# Patient Record
Sex: MALE | Employment: UNEMPLOYED | URBAN - METROPOLITAN AREA
[De-identification: names, ages, dates, MRNs, and addresses within clinical notes are randomized per-mention and may not be internally consistent; named-entity substitution may affect disease eponyms.]

---

## 2019-09-17 ENCOUNTER — ANCILLARY PROCEDURE (OUTPATIENT)
Dept: GENERAL RADIOLOGY | Facility: CLINIC | Age: 24
End: 2019-09-17
Attending: FAMILY MEDICINE

## 2019-09-17 ENCOUNTER — PATIENT OUTREACH (OUTPATIENT)
Dept: CARE COORDINATION | Facility: CLINIC | Age: 24
End: 2019-09-17

## 2019-09-17 ENCOUNTER — OFFICE VISIT (OUTPATIENT)
Dept: FAMILY MEDICINE | Facility: CLINIC | Age: 24
End: 2019-09-17

## 2019-09-17 ENCOUNTER — TELEPHONE (OUTPATIENT)
Dept: CARE COORDINATION | Facility: CLINIC | Age: 24
End: 2019-09-17

## 2019-09-17 ENCOUNTER — TELEPHONE (OUTPATIENT)
Dept: ADDICTION MEDICINE | Facility: CLINIC | Age: 24
End: 2019-09-17

## 2019-09-17 VITALS
DIASTOLIC BLOOD PRESSURE: 91 MMHG | BODY MASS INDEX: 29.39 KG/M2 | SYSTOLIC BLOOD PRESSURE: 167 MMHG | WEIGHT: 217 LBS | HEIGHT: 72 IN | OXYGEN SATURATION: 94 % | TEMPERATURE: 99 F | HEART RATE: 99 BPM

## 2019-09-17 DIAGNOSIS — F10.20 ALCOHOL DEPENDENCE, CONTINUOUS (H): ICD-10-CM

## 2019-09-17 DIAGNOSIS — M25.512 ACUTE PAIN OF LEFT SHOULDER: Primary | ICD-10-CM

## 2019-09-17 DIAGNOSIS — M25.572 PAIN IN JOINT, ANKLE AND FOOT, LEFT: ICD-10-CM

## 2019-09-17 DIAGNOSIS — M25.512 ACUTE PAIN OF LEFT SHOULDER: ICD-10-CM

## 2019-09-17 PROBLEM — F10.930 ALCOHOL WITHDRAWAL SYNDROME WITHOUT COMPLICATION (H): Status: ACTIVE | Noted: 2018-10-16

## 2019-09-17 LAB
ALBUMIN SERPL-MCNC: 3.7 G/DL (ref 3.4–5)
ALP SERPL-CCNC: 172 U/L (ref 40–150)
ALT SERPL W P-5'-P-CCNC: 122 U/L (ref 0–70)
ANION GAP SERPL CALCULATED.3IONS-SCNC: 8 MMOL/L (ref 3–14)
AST SERPL W P-5'-P-CCNC: 212 U/L (ref 0–45)
BASOPHILS # BLD AUTO: 0 10E9/L (ref 0–0.2)
BASOPHILS NFR BLD AUTO: 0.2 %
BILIRUB SERPL-MCNC: 3.4 MG/DL (ref 0.2–1.3)
BUN SERPL-MCNC: 10 MG/DL (ref 7–30)
CALCIUM SERPL-MCNC: 9.8 MG/DL (ref 8.5–10.1)
CHLORIDE SERPL-SCNC: 102 MMOL/L (ref 94–109)
CO2 SERPL-SCNC: 27 MMOL/L (ref 20–32)
CREAT SERPL-MCNC: 0.52 MG/DL (ref 0.66–1.25)
CRP SERPL-MCNC: 133 MG/L (ref 0–8)
DIFFERENTIAL METHOD BLD: ABNORMAL
EOSINOPHIL # BLD AUTO: 0 10E9/L (ref 0–0.7)
EOSINOPHIL NFR BLD AUTO: 0.1 %
ERYTHROCYTE [DISTWIDTH] IN BLOOD BY AUTOMATED COUNT: 13.6 % (ref 10–15)
GFR SERPL CREATININE-BSD FRML MDRD: >90 ML/MIN/{1.73_M2}
GLUCOSE SERPL-MCNC: 119 MG/DL (ref 70–99)
HCT VFR BLD AUTO: 42.7 % (ref 40–53)
HGB BLD-MCNC: 14.1 G/DL (ref 13.3–17.7)
LYMPHOCYTES # BLD AUTO: 1.4 10E9/L (ref 0.8–5.3)
LYMPHOCYTES NFR BLD AUTO: 11.9 %
MCH RBC QN AUTO: 31.9 PG (ref 26.5–33)
MCHC RBC AUTO-ENTMCNC: 33 G/DL (ref 31.5–36.5)
MCV RBC AUTO: 97 FL (ref 78–100)
MONOCYTES # BLD AUTO: 1.8 10E9/L (ref 0–1.3)
MONOCYTES NFR BLD AUTO: 15.5 %
NEUTROPHILS # BLD AUTO: 8.4 10E9/L (ref 1.6–8.3)
NEUTROPHILS NFR BLD AUTO: 72.3 %
PLATELET # BLD AUTO: 206 10E9/L (ref 150–450)
POTASSIUM SERPL-SCNC: 3.5 MMOL/L (ref 3.4–5.3)
PROT SERPL-MCNC: 9 G/DL (ref 6.8–8.8)
RBC # BLD AUTO: 4.42 10E12/L (ref 4.4–5.9)
SODIUM SERPL-SCNC: 137 MMOL/L (ref 133–144)
WBC # BLD AUTO: 11.6 10E9/L (ref 4–11)

## 2019-09-17 PROCEDURE — 80053 COMPREHEN METABOLIC PANEL: CPT | Performed by: FAMILY MEDICINE

## 2019-09-17 PROCEDURE — 73030 X-RAY EXAM OF SHOULDER: CPT | Mod: LT

## 2019-09-17 PROCEDURE — 85025 COMPLETE CBC W/AUTO DIFF WBC: CPT | Performed by: FAMILY MEDICINE

## 2019-09-17 PROCEDURE — 99214 OFFICE O/P EST MOD 30 MIN: CPT | Performed by: FAMILY MEDICINE

## 2019-09-17 PROCEDURE — 73610 X-RAY EXAM OF ANKLE: CPT | Mod: RT

## 2019-09-17 PROCEDURE — 86140 C-REACTIVE PROTEIN: CPT | Performed by: FAMILY MEDICINE

## 2019-09-17 PROCEDURE — 36415 COLL VENOUS BLD VENIPUNCTURE: CPT | Performed by: FAMILY MEDICINE

## 2019-09-17 RX ORDER — NAPROXEN 500 MG/1
500 TABLET ORAL 2 TIMES DAILY WITH MEALS
Qty: 30 TABLET | Refills: 1 | Status: SHIPPED | OUTPATIENT
Start: 2019-09-17 | End: 2021-05-11

## 2019-09-17 ASSESSMENT — MIFFLIN-ST. JEOR: SCORE: 2007.31

## 2019-09-17 ASSESSMENT — PAIN SCALES - GENERAL: PAINLEVEL: EXTREME PAIN (8)

## 2019-09-17 NOTE — PROGRESS NOTES
Subjective     Tulio Rosales is a 24 year old male who presents to clinic today for the following health issues:    HPI   Musculoskeletal problem/pain      Duration: 3 weeks ago- pain went away and came back    Description  Location: left shoulder and right ankle    Intensity:  8/10    Accompanying signs and symptoms: swelling in the shoulder, some swelling in the right ankle    History  Previous similar problem: no   Previous evaluation:  none    Precipitating or alleviating factors:  Trauma or overuse: YES- Fell in the tub a month ago or a little less and fell on the back not on the shoulder   Aggravating factors include: moving, sitting, lifting- anything hurts    Therapies tried and outcome: Ibuprofen helps to a certain extent.    Preeti Celeste MA    Patient is here with significant left shoulder pain and right ankle pain.  He admits to falling at home about 3 weeks ago in the bathtub but did not have any pain immediately after that for a few days and then that is when the shoulder pain started.  It got better after about a week and a now has worsened again and he considers it unbearable.  All movements hurt.  He is unable to sleep because of the pain.  He withdraws an area of pain along the clavicle to the acromion back over the scapula and down into the proximal humerus but nothing radiating down below the elbow into the hands or fingers.  He has not had any fevers, or chills.    He also notes some right ankle pain.  He does not recall any particular injury.  This has become increasingly swollen and painful over the last week.  It is making it more difficult to walk.  Over-the-counter medications have not been helpful.  He denies any urethral discharge or dysuria.  He does not have any rashes.  No fevers, or chills noted.    When questioned today he does confirm a significant history of alcohol use.  He drinks probably a liter of vodka per day.  When I mentioned the possibility of an addiction medicine  referral he expressed interest in doing that.  He is here with his fiancée today.  He has had some emergency room visits for alcohol intoxication and has been admitted to detox in the past but has not been able to sustain remission from alcohol use for any period of time or find an AA program helpful for him.        Patient Active Problem List   Diagnosis     Alcohol withdrawal syndrome without complication (H)     Past Surgical History:   Procedure Laterality Date     NO HISTORY OF SURGERY         Social History     Tobacco Use     Smoking status: Never Smoker     Smokeless tobacco: Never Used   Substance Use Topics     Alcohol use: Yes     Comment: one liter per day vodka     History reviewed. No pertinent family history.      Current Outpatient Medications   Medication Sig Dispense Refill     IBUPROFEN PO        naproxen (NAPROSYN) 500 MG tablet Take 1 tablet (500 mg) by mouth 2 times daily (with meals) 30 tablet 1     No Known Allergies    Reviewed and updated as needed this visit by Provider  Tobacco  Allergies  Meds  Problems  Med Hx  Surg Hx  Fam Hx  Soc Hx          Review of Systems   ROS COMP: Constitutional, HEENT, cardiovascular, pulmonary, gi and gu systems are negative, except as otherwise noted.      Objective    BP (!) 167/91 (BP Location: Right arm, Patient Position: Chair, Cuff Size: Adult Regular)   Pulse 99   Temp 99  F (37.2  C) (Oral)   Ht 1.829 m (6')   Wt 98.4 kg (217 lb)   SpO2 94%   BMI 29.43 kg/m    Body mass index is 29.43 kg/m .  Physical Exam   GENERAL: healthy, alert and no distress  EYES: Eyes grossly normal to inspection, PERRL and conjunctivae and sclerae normal  NECK: no adenopathy, no asymmetry, masses, or scars and thyroid normal to palpation  MS: no gross musculoskeletal defects noted, no edema  MS: Today he really only tolerates inspection of the shoulder with palpation of some of the bony landmarks but even with light palpation his pain response seems exaggerated.   There is no obvious deformity.  I do not appreciate any joint effusions around the ankle or shoulder or warmth suggestive of infection.  The left elbow has normal range of motion for flexion, extension, supination, pronation.  The wrist has normal flexion and extension.  Inspection of the right ankle does show some swelling and bruising over the medial malleolus.  The Achilles tendon is intact to palpation.  There is no tenderness laterally over the fibula or lateral malleolus.  No obvious joint effusion is noted.  SKIN: no suspicious lesions or rashes  NEURO: Normal strength and tone, mentation intact and speech normal  PSYCH: mentation appears normal, anxious, judgement and insight intact, appearance well groomed and at times during the examination he is tremulous and somewhat resistant.    Diagnostic Test Results:  Labs reviewed in Epic  Xray -x-ray of the left shoulder was done and I do not appreciate any acute fracture or dislocation.  X-ray of the left ankle was done and I do not appreciate any acute fracture dislocation.  Both x-rays were reviewed with the patient.        Assessment & Plan     1. Acute pain of left shoulder  His pain response seems out of proportion to what I would suspect based on the limited exam findings and normal x-rays today.  I do not see a lot of evidence to suggest septic arthritis but did check some blood work today.  Naproxen for pain as I am concerned about the mixture of alcohol and anything stronger.  Laboratory tests as noted.  I would like him to see orthopedics in the near future for an assessment as well.  - XR Shoulder Left G/E 3 Views; Future  - CBC with platelets and differential  - CRP, inflammation  - ORTHOPEDICS ADULT REFERRAL  - naproxen (NAPROSYN) 500 MG tablet; Take 1 tablet (500 mg) by mouth 2 times daily (with meals)  Dispense: 30 tablet; Refill: 1    2. Pain in joint, ankle and foot, left  As above.  - XR Ankle Right G/E 3 Views; Future  - CBC with platelets and  differential  - CRP, inflammation  - ORTHOPEDICS ADULT REFERRAL  - naproxen (NAPROSYN) 500 MG tablet; Take 1 tablet (500 mg) by mouth 2 times daily (with meals)  Dispense: 30 tablet; Refill: 1    3. Alcohol dependence, continuous (H)  He uses alcohol heavily and is been into the emergency room a number of times of intoxication sent to detox.  When I mentioned the possibility of an addiction medicine referral today he expressed interest in that.  That referral was placed as well as a care coordination referral since he is currently without insurance.  He was interested in learning a little bit about his liver functions so CMP was checked today.  - Comprehensive metabolic panel (BMP + Alb, Alk Phos, ALT, AST, Total. Bili, TP)  - ADDICTION MEDICINE REFERRAL  - CARE COORDINATION REFERRAL     BMI:   Estimated body mass index is 29.43 kg/m  as calculated from the following:    Height as of this encounter: 1.829 m (6').    Weight as of this encounter: 98.4 kg (217 lb).   Weight management plan: Discussed healthy diet and exercise guidelines            Return in about 2 weeks (around 10/1/2019) for Routine Visit.    Marquise Rico MD  [facility]

## 2019-09-17 NOTE — LETTER
September 17, 2019      Tulio Rosales  [address]   [place] [address]        To Whom It May Concern:    Tulio Rosales was seen in our clinic. He may return to work without restrictions on 9/20/2019.  Please excuse him from work missed up until this time.      Sincerely,        Marquise Rico MD

## 2019-09-17 NOTE — TELEPHONE ENCOUNTER
Please review referral. Please route back to reception pool #[id number].    Note: Pt has no insurance listed in Epic.    Thank you,    Mabel Garcia  [facility]

## 2019-09-17 NOTE — TELEPHONE ENCOUNTER
Patient is request an excuse letter for work. He stated he has been out of work for a week and feels that it will be additional 2 days.      Please reach out to patient with questions and when letter is completed.

## 2019-09-17 NOTE — PROGRESS NOTES
The clinic Community Health Worker with  the patient today at the request of the PCP to discuss possible Clinic Care Coordination enrollment.  The service was described to the patient and immediate needs were discussed.  The patient declined enrollement at this time.  The PCP is encouraged to refer in the future if the patient's needs change.    Notes:   Patient stated he doesn't need support at this time to apply for insurance. He is applying through [employer] and was going to pick a plan. However the system shut down. I advised to clear cookies and history and it should hopefully work.     During my call, I provided him 2 Brokers to discuss insurance options with.     [employer]   [phone]    Face To face  [phone]    Patient asked for an excuse letter for work. Telephone request sent to PCP.     Reason for Referral: Financial Support: Insurance and Medication Affordability and Mental Wellness (Health) (Mental Illness/Chemical Depedency): Chemical Health Assessments  Additional pertinent details: Also referred to addiction

## 2019-09-17 NOTE — TELEPHONE ENCOUNTER
Writer spoke with pt. Pt stated that he doesn't have current insurance but is starting the process and will reach out to the clinic as soon as he has insurance. Writer is closing encounter as pt stated he will call once insurance issue is handled.     Mabel Garcia  [facility]

## 2019-09-18 ENCOUNTER — OFFICE VISIT (OUTPATIENT)
Dept: ORTHOPEDICS | Facility: CLINIC | Age: 24
End: 2019-09-18

## 2019-09-18 ENCOUNTER — TELEPHONE (OUTPATIENT)
Dept: FAMILY MEDICINE | Facility: CLINIC | Age: 24
End: 2019-09-18

## 2019-09-18 VITALS
DIASTOLIC BLOOD PRESSURE: 98 MMHG | OXYGEN SATURATION: 98 % | BODY MASS INDEX: 29.39 KG/M2 | SYSTOLIC BLOOD PRESSURE: 163 MMHG | HEIGHT: 72 IN | HEART RATE: 62 BPM | WEIGHT: 217 LBS

## 2019-09-18 DIAGNOSIS — M25.512 PAIN OF LEFT STERNOCLAVICULAR JOINT: Primary | ICD-10-CM

## 2019-09-18 DIAGNOSIS — M13.0 POLYARTHRITIS: ICD-10-CM

## 2019-09-18 DIAGNOSIS — M19.012 ARTHRITIS OF LEFT ACROMIOCLAVICULAR JOINT: ICD-10-CM

## 2019-09-18 LAB — ERYTHROCYTE [SEDIMENTATION RATE] IN BLOOD BY WESTERGREN METHOD: 44 MM/H (ref 0–15)

## 2019-09-18 PROCEDURE — 99244 OFF/OP CNSLTJ NEW/EST MOD 40: CPT | Performed by: ORTHOPAEDIC SURGERY

## 2019-09-18 PROCEDURE — 85652 RBC SED RATE AUTOMATED: CPT | Performed by: ORTHOPAEDIC SURGERY

## 2019-09-18 PROCEDURE — 84550 ASSAY OF BLOOD/URIC ACID: CPT | Performed by: ORTHOPAEDIC SURGERY

## 2019-09-18 PROCEDURE — 36415 COLL VENOUS BLD VENIPUNCTURE: CPT | Performed by: ORTHOPAEDIC SURGERY

## 2019-09-18 RX ORDER — CEPHALEXIN 500 MG/1
500 CAPSULE ORAL 3 TIMES DAILY
Qty: 60 CAPSULE | Refills: 1 | Status: SHIPPED | OUTPATIENT
Start: 2019-09-18 | End: 2021-05-11

## 2019-09-18 ASSESSMENT — MIFFLIN-ST. JEOR: SCORE: 2007.31

## 2019-09-18 NOTE — LETTER
9/18/2019         RE: Tulio Rosales  [address]  [place] [address]        Dear Colleague,    Thank you for referring your patient, Tulio Rosales, to the [facility]. Please see a copy of my visit note below.    SUBJECTIVE:  Tulio Rosales is a 24 year old male who is seen in consultation at the request of Marquise Rico MD  for left shoulder pain that started  that started/occurred  Approximately 4 weeks ago. Fall at home in the bathtub.   Pain started 3 weeks ago, then resolved, then the pain started up a second time 8 days ago.  Severe pain.  Pain to move shoulder.  Naproxen helps the pain a lot.   + fever/chills.   + night sweats.  He has not had a recent illness.  Pain location: trapezial, chest wall and worst StC joint  Associated symptoms: neck pain, which seem to the patient to be related to the shoulder symptoms    Prior history of related problems: no prior problems with this area in the past  Significant Orthopedic past medical history: none    He is a heavy drinker and has admitted to having a problem with alcohol.  No other street drugs reported.    Past Medical History:   Diagnosis Date     Admitted to substance misuse detoxification center        Past Surgical History:   Procedure Laterality Date     NO HISTORY OF SURGERY         REVIEW OF SYSTEMS:  CONSTITUTIONAL:  NEGATIVE for fever, chills, change in weight  INTEGUMENTARY/SKIN:  NEGATIVE for worrisome rashes, moles or lesions  EYES:  NEGATIVE for vision changes or irritation  ENT/MOUTH:  NEGATIVE for ear, mouth and throat problems  RESP:  NEGATIVE for significant cough or SOB  BREAST:  NEGATIVE for masses, tenderness or discharge  CV:  POSITIVE for palpitations and no history of murmurs that he knows of.  GI:  NEGATIVE for nausea, abdominal pain, heartburn, or change in bowel habits  :  Negative   MUSCULOSKELETAL:  See HPI above.  Ankle swollen and pain started 1 week ago. Pain on medial side.   No injury.  Pain with weight bearing.   NEURO:  NEGATIVE for weakness, dizziness or paresthesias  ENDOCRINE:  NEGATIVE for temperature intolerance, skin/hair changes  HEME/ALLERGY/IMMUNE:  NEGATIVE for bleeding problems  PSYCHIATRIC:  NEGATIVE for changes in mood or affect    OBJECTIVE:  BP (!) 163/98 (BP Location: Left arm, Patient Position: Sitting, Cuff Size: Adult Regular)   Pulse 62   Ht 1.829 m (6')   Wt 98.4 kg (217 lb)   SpO2 98%   BMI 29.43 kg/m         GENERAL: healthy, alert.  He is slightly agitated.  GAIT: normal   SKIN: no suspicious lesions or rashes  NEURO: Normal strength and tone, mentation intact and speech normal  VASCULAR:  normal pulses and cappillary refill   PSYCH:  mentation appears normal and affect normal/bright  RESP: No increased work of breathing  LYMPH:  No lymphedema    MUSCULOSKELETAL:  Right ankle:  Some erythema over medial malleolus, and a contusion.  Some mild localized swelling, but no fluid pocket.  Some fullness and tenderness over posterior tibial tendon.  Ankle range of motion is somewhat limited, but able    NECK:  Cervical range of motion: limited by 25% in lateral tilt, and reproduces pain in the StC joint.  Pain in the neck with lateral tilt to left.    Sensory deficits:  none  Motor deficits:  none    SHOULDER:  Shoulder Inspection: He does have some swelling of the left sternoclavicular joint.  There may be some slight swelling over the left AC joint as well.  I do not see any redness over these joints.    Tender: He is extremely tender over the sternoclavicular joint on the left side.  Even light touch is painful here.  Light touch over the left AC joint is painful as well.  He has a fair amount of tenderness throughout the left trapezius muscle also.    Range of Motion:   Active: He is not willing to move his shoulder activelymuch   Passive: I am able to move his shoulder fairly well, but it causes pain which is referred to the AC joint sternoclavicular joint  and trapezius region.  Impingement:   Strength: Not tested     X-RAY INTERPRETATION  Left shoulder, 3 views from 9/17/2019   Essentially normal    Labs:   CRP Inflammation 133.0  High   0.0 - 8.0 mg/L Final 09/17/2019 11:11 AM     WBC 11.6  High   4.0 - 11.0 10e9/L Final 09/17/2019 11:11 AM CP     Results for RAYO BROUSSARD (MRN [id number]) as of 9/18/2019 17:53   Ref. Range 9/17/2019 11:11 9/18/2019 15:02   Sed Rate Latest Ref Range: 0 - 15 mm/h  44 (H)       ASSESSMENT    ICD-10-CM    1. Pain of left sternoclavicular joint M25.512 Uric acid     Erythrocyte sedimentation rate auto     cephALEXin (KEFLEX) 500 MG capsule     ORTHO  REFERRAL     Gram stain     Anaerobic bacterial culture     Cell count with differential fluid     CANCELED: Fluid Culture Aerobic Bacterial   2. Arthritis of left acromioclavicular joint M19.012    3. Polyarthritis M13.0      Multifocal polyarthritis possibly septic.  This involves the left sternoclavicular and AC joints as well as the right ankle, with some cellulitis and possibly some posterior tibial tendinitis.    PLAN:   I ordered a uric acid level, checking for gout.  Sed rate was ordered and is 44  I suggested that the patient be admitted for further evaluation, testing and IV antibiotics.  He is uninsured and refuses hospital admission at this time.  I did talk to him about the absolute indications for going to the hospital in regards to worsening of his present condition.  I ordered additional rheumatologic serum test to be done  Keflex was ordered.  I have also ordered an  Image guided aspiration to be done of the left sternoclavicular joint, since I think that this may be the most accessible, as well as the most affected joint.  The fluid would be sent for Gram stain and cultures as well as crystal analysis.   We will monitor his results closely and he understands that he should communicate with us closely about his progress.  We will communicate with him about test  results as they come in.    .RADHAMES Campos MD  Dept. Orthopedic Surgery  [facility]    Again, thank you for allowing me to participate in the care of your patient.        Sincerely,        Daniele Campos MD

## 2019-09-18 NOTE — PROGRESS NOTES
SUBJECTIVE:  Tulio Rosales is a 24 year old male who is seen in consultation at the request of Marquise Rico MD  for left shoulder pain that started  that started/occurred  Approximately 4 weeks ago. Fall at home in the bathtub.   Pain started 3 weeks ago, then resolved, then the pain started up a second time 8 days ago.  Severe pain.  Pain to move shoulder.  Naproxen helps the pain a lot.   + fever/chills.   + night sweats.  He has not had a recent illness.  Pain location: trapezial, chest wall and worst StC joint  Associated symptoms: neck pain, which seem to the patient to be related to the shoulder symptoms    Prior history of related problems: no prior problems with this area in the past  Significant Orthopedic past medical history: none    He is a heavy drinker and has admitted to having a problem with alcohol.  No other street drugs reported.    Past Medical History:   Diagnosis Date     Admitted to substance misuse detoxification center        Past Surgical History:   Procedure Laterality Date     NO HISTORY OF SURGERY         REVIEW OF SYSTEMS:  CONSTITUTIONAL:  NEGATIVE for fever, chills, change in weight  INTEGUMENTARY/SKIN:  NEGATIVE for worrisome rashes, moles or lesions  EYES:  NEGATIVE for vision changes or irritation  ENT/MOUTH:  NEGATIVE for ear, mouth and throat problems  RESP:  NEGATIVE for significant cough or SOB  BREAST:  NEGATIVE for masses, tenderness or discharge  CV:  POSITIVE for palpitations and no history of murmurs that he knows of.  GI:  NEGATIVE for nausea, abdominal pain, heartburn, or change in bowel habits  :  Negative   MUSCULOSKELETAL:  See HPI above.  Ankle swollen and pain started 1 week ago. Pain on medial side.  No injury.  Pain with weight bearing.   NEURO:  NEGATIVE for weakness, dizziness or paresthesias  ENDOCRINE:  NEGATIVE for temperature intolerance, skin/hair changes  HEME/ALLERGY/IMMUNE:  NEGATIVE for bleeding problems  PSYCHIATRIC:  NEGATIVE for  changes in mood or affect    OBJECTIVE:  BP (!) 163/98 (BP Location: Left arm, Patient Position: Sitting, Cuff Size: Adult Regular)   Pulse 62   Ht 1.829 m (6')   Wt 98.4 kg (217 lb)   SpO2 98%   BMI 29.43 kg/m        GENERAL: healthy, alert.  He is slightly agitated.  GAIT: normal   SKIN: no suspicious lesions or rashes  NEURO: Normal strength and tone, mentation intact and speech normal  VASCULAR:  normal pulses and cappillary refill   PSYCH:  mentation appears normal and affect normal/bright  RESP: No increased work of breathing  LYMPH:  No lymphedema    MUSCULOSKELETAL:  Right ankle:  Some erythema over medial malleolus, and a contusion.  Some mild localized swelling, but no fluid pocket.  Some fullness and tenderness over posterior tibial tendon.  Ankle range of motion is somewhat limited, but able    NECK:  Cervical range of motion: limited by 25% in lateral tilt, and reproduces pain in the StC joint.  Pain in the neck with lateral tilt to left.    Sensory deficits:  none  Motor deficits:  none    SHOULDER:  Shoulder Inspection: He does have some swelling of the left sternoclavicular joint.  There may be some slight swelling over the left AC joint as well.  I do not see any redness over these joints.    Tender: He is extremely tender over the sternoclavicular joint on the left side.  Even light touch is painful here.  Light touch over the left AC joint is painful as well.  He has a fair amount of tenderness throughout the left trapezius muscle also.    Range of Motion:   Active: He is not willing to move his shoulder activelymuch   Passive: I am able to move his shoulder fairly well, but it causes pain which is referred to the AC joint sternoclavicular joint and trapezius region.  Impingement:   Strength: Not tested     X-RAY INTERPRETATION  Left shoulder, 3 views from 9/17/2019   Essentially normal    Labs:   CRP Inflammation 133.0  High   0.0 - 8.0 mg/L Final 09/17/2019 11:11 AM     WBC 11.6  High   4.0  - 11.0 10e9/L Final 09/17/2019 11:11 AM CP     Results for RAYO BROUSSARD (MRN [id number]) as of 9/18/2019 17:53   Ref. Range 9/17/2019 11:11 9/18/2019 15:02   Sed Rate Latest Ref Range: 0 - 15 mm/h  44 (H)       ASSESSMENT    ICD-10-CM    1. Pain of left sternoclavicular joint M25.512 Uric acid     Erythrocyte sedimentation rate auto     cephALEXin (KEFLEX) 500 MG capsule     ORTHO  REFERRAL     Gram stain     Anaerobic bacterial culture     Cell count with differential fluid     CANCELED: Fluid Culture Aerobic Bacterial   2. Arthritis of left acromioclavicular joint M19.012    3. Polyarthritis M13.0      Multifocal polyarthritis possibly septic.  This involves the left sternoclavicular and AC joints as well as the right ankle, with some cellulitis and possibly some posterior tibial tendinitis.    PLAN:   I ordered a uric acid level, checking for gout.  Sed rate was ordered and is 44  I suggested that the patient be admitted for further evaluation, testing and IV antibiotics.  He is uninsured and refuses hospital admission at this time.  I did talk to him about the absolute indications for going to the hospital in regards to worsening of his present condition.  I ordered additional rheumatologic serum test to be done  Keflex was ordered.  I have also ordered an  Image guided aspiration to be done of the left sternoclavicular joint, since I think that this may be the most accessible, as well as the most affected joint.  The fluid would be sent for Gram stain and cultures as well as crystal analysis.   We will monitor his results closely and he understands that he should communicate with us closely about his progress.  We will communicate with him about test results as they come in.    .RADHAMES Campos MD  Dept. Orthopedic Surgery  [facility]

## 2019-09-18 NOTE — TELEPHONE ENCOUNTER
Notified patient of the message below per provider.  Transferred to  to help schedule ortho appointment.    Malinda Pereira RN,BSN  [facility]

## 2019-09-18 NOTE — TELEPHONE ENCOUNTER
----- Message from Marquise Rico MD sent at 9/18/2019  8:52 AM CDT -----  Please call patient back and let him know that some of the laboratory tests have me a bit concerned about a possible infection in the joint.  This could explain the severity of the joint pain but these are not common problems.  I would really recommend he see the orthopedic doctor for an opinion.    Marquise Rico MD

## 2019-09-19 LAB — URATE SERPL-MCNC: 3.7 MG/DL (ref 3.5–7.2)

## 2019-09-24 ENCOUNTER — OFFICE VISIT (OUTPATIENT)
Dept: ORTHOPEDICS | Facility: CLINIC | Age: 24
End: 2019-09-24

## 2019-09-24 VITALS
OXYGEN SATURATION: 98 % | SYSTOLIC BLOOD PRESSURE: 144 MMHG | HEART RATE: 87 BPM | TEMPERATURE: 98.5 F | DIASTOLIC BLOOD PRESSURE: 88 MMHG

## 2019-09-24 DIAGNOSIS — M13.0 POLYARTHRITIS: Primary | ICD-10-CM

## 2019-09-24 PROCEDURE — 99213 OFFICE O/P EST LOW 20 MIN: CPT | Performed by: ORTHOPAEDIC SURGERY

## 2019-09-24 ASSESSMENT — PAIN SCALES - GENERAL: PAINLEVEL: MILD PAIN (3)

## 2019-09-24 NOTE — PROGRESS NOTES
SUBJECTIVE:  Tulio Rosales is here in follow up of sternoclavicular joint pain and arthritis, as well as polyarthritis, possibly septic. Today he returns doing a lot better.  He has been taking oral antibiotics for 1 week now. Denies furthers fevers, chills or sweats. Pain in the sternoclavicular and AC joints have greatly improved however he continues to have some mild pain there. He has not had his sternoclavicular joint aspiration yet.    Component      Latest Ref Rng & Units 9/17/2019 9/18/2019   WBC      4.0 - 11.0 10e9/L 11.6 (H)    RBC Count      4.4 - 5.9 10e12/L 4.42    Hemoglobin      13.3 - 17.7 g/dL 14.1    Hematocrit      40.0 - 53.0 % 42.7    MCV      78 - 100 fl 97    MCH      26.5 - 33.0 pg 31.9    MCHC      31.5 - 36.5 g/dL 33.0    RDW      10.0 - 15.0 % 13.6    Platelet Count      150 - 450 10e9/L 206    % Neutrophils      % 72.3    % Lymphocytes      % 11.9    % Monocytes      % 15.5    % Eosinophils      % 0.1    % Basophils      % 0.2    Absolute Neutrophil      1.6 - 8.3 10e9/L 8.4 (H)    Absolute Lymphocytes      0.8 - 5.3 10e9/L 1.4    Absolute Monocytes      0.0 - 1.3 10e9/L 1.8 (H)    Absolute Eosinophils      0.0 - 0.7 10e9/L 0.0    Absolute Basophils      0.0 - 0.2 10e9/L 0.0    Diff Method       Automated Method    Sodium      133 - 144 mmol/L 137    Potassium      3.4 - 5.3 mmol/L 3.5    Chloride      94 - 109 mmol/L 102    Carbon Dioxide      20 - 32 mmol/L 27    Anion Gap      3 - 14 mmol/L 8    Glucose      70 - 99 mg/dL 119 (H)    Urea Nitrogen      7 - 30 mg/dL 10    Creatinine      0.66 - 1.25 mg/dL 0.52 (L)    GFR Estimate      >60 mL/min/1.73:m2 >90    GFR Estimate If Black      >60 mL/min/1.73:m2 >90    Calcium      8.5 - 10.1 mg/dL 9.8    Bilirubin Total      0.2 - 1.3 mg/dL 3.4 (H)    Albumin      3.4 - 5.0 g/dL 3.7    Protein Total      6.8 - 8.8 g/dL 9.0 (H)    Alkaline Phosphatase      40 - 150 U/L 172 (H)    ALT      0 - 70 U/L 122 (H)    AST      0 - 45 U/L 212 (H)     CRP Inflammation      0.0 - 8.0 mg/L 133.0 (H)    Uric Acid      3.5 - 7.2 mg/dL  3.7   Sed Rate      0 - 15 mm/h  44 (H)     Past Medical History:   Diagnosis Date     Admitted to substance misuse detoxification center      Polyarthritis of upper arm 09/18/2019    L UE     Past Surgical History:   Procedure Laterality Date     NO HISTORY OF SURGERY        Review of Systems:  Constitutional/General: Negative for fever, chills, change in weight  Integumentary/Skin: Negative for worrisome rashes, moles, or lesions  Neuro: Negative for weakness, dizziness, or paresthesias   Psychiatric: negative for changes in mood or affect    This document serves as a record of the services and decisions personally performed and made by RADHAMES Campos MD. It was created on his behalf by Nayeli Farooq, a trained medical scribe. The creation of this document is based the provider's statements to the medical scribe.    Scribe Nayeli Farooq 2:47 PM 9/24/2019        OBJECTIVE:  Physical Exam:  BP (!) 144/88 (BP Location: Left arm, Patient Position: Sitting, Cuff Size: Adult Regular)   Pulse 87   Temp 98.5  F (36.9  C)   SpO2 98%   General Appearance: healthy, alert and no distress   Skin: no suspicious lesions or rashes  Neuro: Normal strength and tone, mentation intact and speech normal  Vascular: good pulses, and capillary refill   Lymph: no lymphadenopathy   Psych:  mentation appears normal and affect normal/bright  Resp: no increased work of breathing    Left Shoulder Exam:  Shoulder Inspection: no swelling, bruising, discoloration, or obvious deformity or asymmetry  Tender: sternoclavicular joint, left trapezius   Non-tender: AC joint    Tenderness sternoclavicular joint, left trapezius.   Nontender to palpation at AC joint.     ASSESSMENT:  1. Multifocal polyarthritis, possibly septic. Doing well on antibiotics.      PLAN:  At this time, I advised that he continue with the course of antibiotics until finished. I also  recommended he contact us if symptoms worsen again. He may need to see rheumatology or infectious disease for cause of this, should it recur.     Return to clinic: as needed     The information in this document, created by a scribe for me, accurately reflects the services I personally performed and the decisions made by me. I have reviewed and approved this document for accuracy.      RADHAMES Campos MD  Dept. Orthopedic Surgery  [facility]

## 2019-09-24 NOTE — LETTER
9/24/2019         RE: Tulio Rosales  [address]  [place] [place] [address]        Dear Colleague,    Thank you for referring your patient, Tulio Rosales, to the [facility]. Please see a copy of my visit note below.    SUBJECTIVE:  Tulio Rosales is here in follow up of sternoclavicular joint pain and arthritis, as well as polyarthritis, possibly septic. Today he returns doing a lot better.  He has been taking oral antibiotics for 1 week now. Denies furthers fevers, chills or sweats. Pain in the sternoclavicular and AC joints have greatly improved however he continues to have some mild pain there. He has not had his sternoclavicular joint aspiration yet.    Component      Latest Ref Rng & Units 9/17/2019 9/18/2019   WBC      4.0 - 11.0 10e9/L 11.6 (H)    RBC Count      4.4 - 5.9 10e12/L 4.42    Hemoglobin      13.3 - 17.7 g/dL 14.1    Hematocrit      40.0 - 53.0 % 42.7    MCV      78 - 100 fl 97    MCH      26.5 - 33.0 pg 31.9    MCHC      31.5 - 36.5 g/dL 33.0    RDW      10.0 - 15.0 % 13.6    Platelet Count      150 - 450 10e9/L 206    % Neutrophils      % 72.3    % Lymphocytes      % 11.9    % Monocytes      % 15.5    % Eosinophils      % 0.1    % Basophils      % 0.2    Absolute Neutrophil      1.6 - 8.3 10e9/L 8.4 (H)    Absolute Lymphocytes      0.8 - 5.3 10e9/L 1.4    Absolute Monocytes      0.0 - 1.3 10e9/L 1.8 (H)    Absolute Eosinophils      0.0 - 0.7 10e9/L 0.0    Absolute Basophils      0.0 - 0.2 10e9/L 0.0    Diff Method       Automated Method    Sodium      133 - 144 mmol/L 137    Potassium      3.4 - 5.3 mmol/L 3.5    Chloride      94 - 109 mmol/L 102    Carbon Dioxide      20 - 32 mmol/L 27    Anion Gap      3 - 14 mmol/L 8    Glucose      70 - 99 mg/dL 119 (H)    Urea Nitrogen      7 - 30 mg/dL 10    Creatinine      0.66 - 1.25 mg/dL 0.52 (L)    GFR Estimate      >60 mL/min/1.73:m2 >90    GFR Estimate If Black      >60 mL/min/1.73:m2 >90    Calcium      8.5 - 10.1  mg/dL 9.8    Bilirubin Total      0.2 - 1.3 mg/dL 3.4 (H)    Albumin      3.4 - 5.0 g/dL 3.7    Protein Total      6.8 - 8.8 g/dL 9.0 (H)    Alkaline Phosphatase      40 - 150 U/L 172 (H)    ALT      0 - 70 U/L 122 (H)    AST      0 - 45 U/L 212 (H)    CRP Inflammation      0.0 - 8.0 mg/L 133.0 (H)    Uric Acid      3.5 - 7.2 mg/dL  3.7   Sed Rate      0 - 15 mm/h  44 (H)     Past Medical History:   Diagnosis Date     Admitted to substance misuse detoxification center      Polyarthritis of upper arm 09/18/2019    L UE     Past Surgical History:   Procedure Laterality Date     NO HISTORY OF SURGERY        Review of Systems:  Constitutional/General: Negative for fever, chills, change in weight  Integumentary/Skin: Negative for worrisome rashes, moles, or lesions  Neuro: Negative for weakness, dizziness, or paresthesias   Psychiatric: negative for changes in mood or affect    This document serves as a record of the services and decisions personally performed and made by RADHAMES Campos MD. It was created on his behalf by Nayeli Farooq, a trained medical scribe. The creation of this document is based the provider's statements to the medical scribe.    Scribe Nayeli Farooq 2:47 PM 9/24/2019        OBJECTIVE:  Physical Exam:  BP (!) 144/88 (BP Location: Left arm, Patient Position: Sitting, Cuff Size: Adult Regular)   Pulse 87   Temp 98.5  F (36.9  C)   SpO2 98%   General Appearance: healthy, alert and no distress   Skin: no suspicious lesions or rashes  Neuro: Normal strength and tone, mentation intact and speech normal  Vascular: good pulses, and capillary refill   Lymph: no lymphadenopathy   Psych:  mentation appears normal and affect normal/bright  Resp: no increased work of breathing    Left Shoulder Exam:  Shoulder Inspection: no swelling, bruising, discoloration, or obvious deformity or asymmetry  Tender: sternoclavicular joint, left trapezius   Non-tender: AC joint    Tenderness sternoclavicular joint, left  trapezius.   Nontender to palpation at AC joint.     ASSESSMENT:  1. Multifocal polyarthritis, possibly septic. Doing well on antibiotics.      PLAN:  At this time, I advised that he continue with the course of antibiotics until finished. I also recommended he contact us if symptoms worsen again. He may need to see rheumatology or infectious disease for cause of this, should it recur.     Return to clinic: as needed     The information in this document, created by a scribe for me, accurately reflects the services I personally performed and the decisions made by me. I have reviewed and approved this document for accuracy.      RADHAMES Campos MD  Dept. Orthopedic Surgery  [facility]      Again, thank you for allowing me to participate in the care of your patient.        Sincerely,        Daniele Campos MD

## 2021-05-11 ENCOUNTER — OFFICE VISIT (OUTPATIENT)
Dept: FAMILY MEDICINE | Facility: CLINIC | Age: 26
End: 2021-05-11
Payer: COMMERCIAL

## 2021-05-11 VITALS
DIASTOLIC BLOOD PRESSURE: 68 MMHG | HEIGHT: 71 IN | WEIGHT: 205 LBS | HEART RATE: 104 BPM | BODY MASS INDEX: 28.7 KG/M2 | SYSTOLIC BLOOD PRESSURE: 148 MMHG | TEMPERATURE: 98.3 F

## 2021-05-11 DIAGNOSIS — Z86.59 HISTORY OF BEHAVIORAL AND MENTAL HEALTH PROBLEMS: Primary | ICD-10-CM

## 2021-05-11 DIAGNOSIS — L40.50 PSORIASIS WITH ARTHROPATHY (H): ICD-10-CM

## 2021-05-11 DIAGNOSIS — R03.0 ELEVATED BLOOD PRESSURE READING WITHOUT DIAGNOSIS OF HYPERTENSION: ICD-10-CM

## 2021-05-11 DIAGNOSIS — R17 JAUNDICE: ICD-10-CM

## 2021-05-11 DIAGNOSIS — M25.50 ARTHRALGIA, UNSPECIFIED JOINT: ICD-10-CM

## 2021-05-11 DIAGNOSIS — F20.9 SCHIZOPHRENIA, UNSPECIFIED TYPE (H): ICD-10-CM

## 2021-05-11 LAB
BASOPHILS # BLD AUTO: 0.1 10E9/L (ref 0–0.2)
BASOPHILS NFR BLD AUTO: 1 %
DIFFERENTIAL METHOD BLD: ABNORMAL
EOSINOPHIL # BLD AUTO: 0.1 10E9/L (ref 0–0.7)
EOSINOPHIL NFR BLD AUTO: 1.7 %
ERYTHROCYTE [DISTWIDTH] IN BLOOD BY AUTOMATED COUNT: 21.2 % (ref 10–15)
HCT VFR BLD AUTO: 38.8 % (ref 40–53)
HGB BLD-MCNC: 13.9 G/DL (ref 13.3–17.7)
INR PPP: 1.27 (ref 0.86–1.14)
LYMPHOCYTES # BLD AUTO: 1.1 10E9/L (ref 0.8–5.3)
LYMPHOCYTES NFR BLD AUTO: 18.5 %
MCH RBC QN AUTO: 33.8 PG (ref 26.5–33)
MCHC RBC AUTO-ENTMCNC: 35.8 G/DL (ref 31.5–36.5)
MCV RBC AUTO: 94 FL (ref 78–100)
MONOCYTES # BLD AUTO: 1 10E9/L (ref 0–1.3)
MONOCYTES NFR BLD AUTO: 15.9 %
NEUTROPHILS # BLD AUTO: 3.8 10E9/L (ref 1.6–8.3)
NEUTROPHILS NFR BLD AUTO: 62.9 %
PLATELET # BLD AUTO: 102 10E9/L (ref 150–450)
RBC # BLD AUTO: 4.11 10E12/L (ref 4.4–5.9)
WBC # BLD AUTO: 6 10E9/L (ref 4–11)

## 2021-05-11 PROCEDURE — 80076 HEPATIC FUNCTION PANEL: CPT | Performed by: PHYSICIAN ASSISTANT

## 2021-05-11 PROCEDURE — 82977 ASSAY OF GGT: CPT | Performed by: PHYSICIAN ASSISTANT

## 2021-05-11 PROCEDURE — 85610 PROTHROMBIN TIME: CPT | Performed by: PHYSICIAN ASSISTANT

## 2021-05-11 PROCEDURE — 99215 OFFICE O/P EST HI 40 MIN: CPT | Performed by: PHYSICIAN ASSISTANT

## 2021-05-11 PROCEDURE — 85025 COMPLETE CBC W/AUTO DIFF WBC: CPT | Performed by: PHYSICIAN ASSISTANT

## 2021-05-11 PROCEDURE — 36415 COLL VENOUS BLD VENIPUNCTURE: CPT | Performed by: PHYSICIAN ASSISTANT

## 2021-05-11 SDOH — HEALTH STABILITY: MENTAL HEALTH: HOW MANY STANDARD DRINKS CONTAINING ALCOHOL DO YOU HAVE ON A TYPICAL DAY?: NOT ASKED

## 2021-05-11 SDOH — HEALTH STABILITY: MENTAL HEALTH: HOW OFTEN DO YOU HAVE A DRINK CONTAINING ALCOHOL?: NOT ASKED

## 2021-05-11 SDOH — HEALTH STABILITY: MENTAL HEALTH: HOW OFTEN DO YOU HAVE 6 OR MORE DRINKS ON ONE OCCASION?: NOT ASKED

## 2021-05-11 ASSESSMENT — ANXIETY QUESTIONNAIRES
5. BEING SO RESTLESS THAT IT IS HARD TO SIT STILL: SEVERAL DAYS
3. WORRYING TOO MUCH ABOUT DIFFERENT THINGS: MORE THAN HALF THE DAYS
6. BECOMING EASILY ANNOYED OR IRRITABLE: NOT AT ALL
2. NOT BEING ABLE TO STOP OR CONTROL WORRYING: MORE THAN HALF THE DAYS
IF YOU CHECKED OFF ANY PROBLEMS ON THIS QUESTIONNAIRE, HOW DIFFICULT HAVE THESE PROBLEMS MADE IT FOR YOU TO DO YOUR WORK, TAKE CARE OF THINGS AT HOME, OR GET ALONG WITH OTHER PEOPLE: SOMEWHAT DIFFICULT
GAD7 TOTAL SCORE: 12
1. FEELING NERVOUS, ANXIOUS, OR ON EDGE: MORE THAN HALF THE DAYS
7. FEELING AFRAID AS IF SOMETHING AWFUL MIGHT HAPPEN: NEARLY EVERY DAY

## 2021-05-11 ASSESSMENT — PATIENT HEALTH QUESTIONNAIRE - PHQ9
SUM OF ALL RESPONSES TO PHQ QUESTIONS 1-9: 17
5. POOR APPETITE OR OVEREATING: MORE THAN HALF THE DAYS

## 2021-05-11 ASSESSMENT — PAIN SCALES - GENERAL: PAINLEVEL: NO PAIN (1)

## 2021-05-11 ASSESSMENT — MIFFLIN-ST. JEOR: SCORE: 1933

## 2021-05-11 NOTE — PATIENT INSTRUCTIONS
Consider [facility] Det[facility]   Address: [address] , [place] [address]

## 2021-05-11 NOTE — PROGRESS NOTES
"  Assessment & Plan     History of behavioral and mental health problems  History of schizophrenia. Hears voices regularly. Sees things that he knows are not there. \"handling it\" he says and doesn't affect him but see below. Denies any self harm or harm of others thoughts or ideas.   Recommend reevaluate with psychiatry. I strongly wanted him to try a mood stabilizer/antipsych med but he refused.  See below.   - MENTAL HEALTH REFERRAL  - Adult; Psychiatry; Psychiatry; [facility] [place]: [facility] to Long-Term Psychiatry as indicated ([phone]); No - Patient must be evaluated prior to placing referral OR document reason for refer...    Schizophrenia, unspecified type (H)  History of. Hearing and seeing things. Denies self harm thoughts or ideas. Is stable in housing. Has social situation in order but needs treatment. Refuses anything from me today. See below    Alcoholism /alcohol abuse (H)  10-12 ounces daily. Used to be a liter daily. Jaundice on exam/icterus - he has breast tissue. He has some concerning exam findings. Drinking since he was 19 - daily.   Has detoxed one time.   I want him to go to the ER for detox but he declines right now. I spoke with him and his girlfriend and he is interested in going but states he cannot now because money is tight. His exam is concerning and he is in acute liver distress - will check labs.   - CBC with platelets and differential  - INR  - Hepatic panel (Albumin, ALT, AST, Bili, Alk Phos, TP)  - GGT  - Ammonia; Future    Psoriasis with arthropathy (H)  Multiple patches and plaques suspicious for psoriasis.   - DERMATOLOGY ADULT REFERRAL; Future    Arthralgia, unspecified joint  Concerning for psoriatic / ETOH induced causes - will defer this concern today for now     Jaundice  Severe - advised labs and DETOX today but he declined. Will consider however   - CBC with platelets and differential  - INR  - Hepatic panel (Albumin, ALT, AST, Bili, Alk Phos, " "TP)  - GGT  - Ammonia; Future    Elevated blood pressure reading without diagnosis of hypertension  Probably due to severe alcohol abuse      BMI:   Estimated body mass index is 28.29 kg/m  as calculated from the following:    Height as of this encounter: 1.813 m (5' 11.38\").    Weight as of this encounter: 93 kg (205 lb).       Depression Screening Follow Up    PHQ 5/11/2021   PHQ-9 Total Score 17   Q9: Thoughts of better off dead/self-harm past 2 weeks Not at all       Follow Up Actions Taken          No follow-ups on file.    [facility]    Delores Antunez is a 26 year old who presents for the following health issues accompanied by his partner:    HPI     Patient would like referral for mental health and discuss FMLA forms. FMLA is for mental health challenges. He has a known diagnosis he says of Schizophrenia which is not treated. He hears voices and sees things. No injurious behaviors or harm to other thoughts or ideas.      Constantly has pain in his joints, joints are not well, had a right knee injury when he was 17. Shoulder pain, been through a lot of physical trauma. Today pain is 1/10.     Alcohol abuse - drinks 10-12 ounce a day. Used to be 1 liter. Drinking for 10 years. Wants to stop. Is \"working on it.\" Has history of seizures and detox one time. He notes his skin and eyes are yellow.    Patient Active Problem List   Diagnosis     Alcohol withdrawal syndrome without complication (H)     Schizophrenia, unspecified type (H)     History of behavioral and mental health problems     Psoriasis with arthropathy (H)     Alcoholism /alcohol abuse (H)      Current Outpatient Medications   Medication     IBUPROFEN PO     No current facility-administered medications for this visit.             Review of Systems   Constitutional, HEENT, cardiovascular, pulmonary, GI, , musculoskeletal, neuro, skin, endocrine and psych systems are negative, except as " "otherwise noted.      Objective    BP (!) 148/68 (BP Location: Right arm, Patient Position: Chair, Cuff Size: Adult Large)   Pulse 104   Temp 98.3  F (36.8  C) (Oral)   Ht 1.813 m (5' 11.38\")   Wt 93 kg (205 lb)   BMI 28.29 kg/m    Body mass index is 28.29 kg/m .  Physical Exam   GENERAL: healthy, alert and no distress  EYES: icterus noted   SKIN: Jaundice noted. Thick erythematous plaques with silvery scale noted on many areas of legs, arms, hands. He has breast tissue   NEURO: Small tremor. Otherwise Normal strength and tone, mentation intact and speech normal  PSYCH: mentation appears normal, affect normal/bright        "

## 2021-05-12 ENCOUNTER — TELEPHONE (OUTPATIENT)
Dept: FAMILY MEDICINE | Facility: CLINIC | Age: 26
End: 2021-05-12

## 2021-05-12 ENCOUNTER — HOSPITAL ENCOUNTER (INPATIENT)
Facility: CLINIC | Age: 26
LOS: 1 days | Discharge: LEFT BEFORE DISCHARGE | DRG: 433 | End: 2021-05-13
Attending: FAMILY MEDICINE | Admitting: STUDENT IN AN ORGANIZED HEALTH CARE EDUCATION/TRAINING PROGRAM
Payer: COMMERCIAL

## 2021-05-12 ENCOUNTER — APPOINTMENT (OUTPATIENT)
Dept: ULTRASOUND IMAGING | Facility: CLINIC | Age: 26
DRG: 433 | End: 2021-05-12
Attending: FAMILY MEDICINE
Payer: COMMERCIAL

## 2021-05-12 DIAGNOSIS — F10.239 ALCOHOL DEPENDENCE WITH WITHDRAWAL WITH COMPLICATION (H): ICD-10-CM

## 2021-05-12 DIAGNOSIS — Z20.822 CONTACT WITH AND (SUSPECTED) EXPOSURE TO COVID-19: ICD-10-CM

## 2021-05-12 DIAGNOSIS — R17 JAUNDICE: ICD-10-CM

## 2021-05-12 DIAGNOSIS — B17.9 ACUTE HEPATITIS: ICD-10-CM

## 2021-05-12 LAB
ALBUMIN SERPL-MCNC: 2.9 G/DL (ref 3.4–5)
ALBUMIN SERPL-MCNC: 3 G/DL (ref 3.4–5)
ALBUMIN UR-MCNC: 100 MG/DL
ALP SERPL-CCNC: 197 U/L (ref 40–150)
ALP SERPL-CCNC: 254 U/L (ref 40–150)
ALT SERPL W P-5'-P-CCNC: 61 U/L (ref 0–70)
ALT SERPL W P-5'-P-CCNC: 68 U/L (ref 0–70)
AMMONIA PLAS-SCNC: <10 UMOL/L (ref 10–50)
AMPHETAMINES UR QL SCN: NEGATIVE
ANION GAP SERPL CALCULATED.3IONS-SCNC: 8 MMOL/L (ref 3–14)
APAP SERPL-MCNC: NORMAL MG/L (ref 10–20)
APAP SERPL-MCNC: NORMAL MG/L (ref 10–20)
APPEARANCE UR: ABNORMAL
APTT PPP: 40 SEC (ref 22–37)
AST SERPL W P-5'-P-CCNC: 239 U/L (ref 0–45)
AST SERPL W P-5'-P-CCNC: 255 U/L (ref 0–45)
BARBITURATES UR QL: NEGATIVE
BASOPHILS # BLD AUTO: 0.1 10E9/L (ref 0–0.2)
BASOPHILS NFR BLD AUTO: 1.7 %
BENZODIAZ UR QL: NEGATIVE
BILIRUB DIRECT SERPL-MCNC: 12.5 MG/DL (ref 0–0.2)
BILIRUB DIRECT SERPL-MCNC: 13.1 MG/DL (ref 0–0.2)
BILIRUB SERPL-MCNC: 16.3 MG/DL (ref 0.2–1.3)
BILIRUB SERPL-MCNC: 17 MG/DL (ref 0.2–1.3)
BILIRUB UR QL STRIP: ABNORMAL
BUN SERPL-MCNC: 6 MG/DL (ref 7–30)
CALCIUM SERPL-MCNC: 8.6 MG/DL (ref 8.5–10.1)
CANNABINOIDS UR QL SCN: NEGATIVE
CHLORIDE SERPL-SCNC: 105 MMOL/L (ref 94–109)
CO2 SERPL-SCNC: 24 MMOL/L (ref 20–32)
COCAINE UR QL: NEGATIVE
COLOR UR AUTO: ABNORMAL
CREAT SERPL-MCNC: 0.51 MG/DL (ref 0.66–1.25)
DIFFERENTIAL METHOD BLD: ABNORMAL
EOSINOPHIL # BLD AUTO: 0.1 10E9/L (ref 0–0.7)
EOSINOPHIL NFR BLD AUTO: 1.2 %
ERYTHROCYTE [DISTWIDTH] IN BLOOD BY AUTOMATED COUNT: 21.2 % (ref 10–15)
ETHANOL SERPL-MCNC: 0.2 G/DL
ETHANOL UR QL SCN: POSITIVE
GFR SERPL CREATININE-BSD FRML MDRD: >90 ML/MIN/{1.73_M2}
GGT SERPL-CCNC: 2379 U/L (ref 0–75)
GLUCOSE SERPL-MCNC: 98 MG/DL (ref 70–99)
GLUCOSE UR STRIP-MCNC: NEGATIVE MG/DL
HCT VFR BLD AUTO: 37 % (ref 40–53)
HGB BLD-MCNC: 13.2 G/DL (ref 13.3–17.7)
HGB UR QL STRIP: ABNORMAL
HYALINE CASTS #/AREA URNS LPF: 9 /LPF (ref 0–2)
IMM GRANULOCYTES # BLD: 0 10E9/L (ref 0–0.4)
IMM GRANULOCYTES NFR BLD: 0.3 %
INR PPP: 1.38 (ref 0.86–1.14)
KETONES UR STRIP-MCNC: NEGATIVE MG/DL
LABORATORY COMMENT REPORT: NORMAL
LEUKOCYTE ESTERASE UR QL STRIP: NEGATIVE
LYMPHOCYTES # BLD AUTO: 1.1 10E9/L (ref 0.8–5.3)
LYMPHOCYTES NFR BLD AUTO: 16.4 %
MAGNESIUM SERPL-MCNC: 2 MG/DL (ref 1.6–2.3)
MCH RBC QN AUTO: 33.3 PG (ref 26.5–33)
MCHC RBC AUTO-ENTMCNC: 35.7 G/DL (ref 31.5–36.5)
MCV RBC AUTO: 93 FL (ref 78–100)
MONOCYTES # BLD AUTO: 1 10E9/L (ref 0–1.3)
MONOCYTES NFR BLD AUTO: 15.3 %
MUCOUS THREADS #/AREA URNS LPF: PRESENT /LPF
NEUTROPHILS # BLD AUTO: 4.3 10E9/L (ref 1.6–8.3)
NEUTROPHILS NFR BLD AUTO: 65.1 %
NITRATE UR QL: NEGATIVE
NRBC # BLD AUTO: 0 10*3/UL
NRBC BLD AUTO-RTO: 0 /100
OPIATES UR QL SCN: NEGATIVE
PH UR STRIP: 6.5 PH (ref 5–7)
PHOSPHATE SERPL-MCNC: 2.9 MG/DL (ref 2.5–4.5)
PLATELET # BLD AUTO: 107 10E9/L (ref 150–450)
POTASSIUM SERPL-SCNC: 3.8 MMOL/L (ref 3.4–5.3)
PROT SERPL-MCNC: 7.7 G/DL (ref 6.8–8.8)
PROT SERPL-MCNC: 7.9 G/DL (ref 6.8–8.8)
RBC # BLD AUTO: 3.96 10E12/L (ref 4.4–5.9)
RBC #/AREA URNS AUTO: 16 /HPF (ref 0–2)
SARS-COV-2 RNA RESP QL NAA+PROBE: NEGATIVE
SODIUM SERPL-SCNC: 136 MMOL/L (ref 133–144)
SOURCE: ABNORMAL
SP GR UR STRIP: 1.02 (ref 1–1.03)
SPECIMEN SOURCE: NORMAL
SQUAMOUS #/AREA URNS AUTO: 0 /HPF (ref 0–1)
UROBILINOGEN UR STRIP-MCNC: 4 MG/DL (ref 0–2)
WBC # BLD AUTO: 6.7 10E9/L (ref 4–11)
WBC #/AREA URNS AUTO: 2 /HPF (ref 0–5)

## 2021-05-12 PROCEDURE — 99285 EMERGENCY DEPT VISIT HI MDM: CPT | Mod: 25 | Performed by: FAMILY MEDICINE

## 2021-05-12 PROCEDURE — 82077 ASSAY SPEC XCP UR&BREATH IA: CPT | Performed by: FAMILY MEDICINE

## 2021-05-12 PROCEDURE — 80320 DRUG SCREEN QUANTALCOHOLS: CPT | Performed by: FAMILY MEDICINE

## 2021-05-12 PROCEDURE — 85610 PROTHROMBIN TIME: CPT | Performed by: FAMILY MEDICINE

## 2021-05-12 PROCEDURE — 81001 URINALYSIS AUTO W/SCOPE: CPT | Performed by: FAMILY MEDICINE

## 2021-05-12 PROCEDURE — 80053 COMPREHEN METABOLIC PANEL: CPT | Performed by: FAMILY MEDICINE

## 2021-05-12 PROCEDURE — 250N000011 HC RX IP 250 OP 636: Performed by: FAMILY MEDICINE

## 2021-05-12 PROCEDURE — 86803 HEPATITIS C AB TEST: CPT | Performed by: FAMILY MEDICINE

## 2021-05-12 PROCEDURE — 99285 EMERGENCY DEPT VISIT HI MDM: CPT | Performed by: FAMILY MEDICINE

## 2021-05-12 PROCEDURE — 93975 VASCULAR STUDY: CPT

## 2021-05-12 PROCEDURE — 120N000002 HC R&B MED SURG/OB UMMC

## 2021-05-12 PROCEDURE — 96374 THER/PROPH/DIAG INJ IV PUSH: CPT | Performed by: FAMILY MEDICINE

## 2021-05-12 PROCEDURE — 96361 HYDRATE IV INFUSION ADD-ON: CPT | Performed by: FAMILY MEDICINE

## 2021-05-12 PROCEDURE — 87340 HEPATITIS B SURFACE AG IA: CPT | Performed by: FAMILY MEDICINE

## 2021-05-12 PROCEDURE — U0003 INFECTIOUS AGENT DETECTION BY NUCLEIC ACID (DNA OR RNA); SEVERE ACUTE RESPIRATORY SYNDROME CORONAVIRUS 2 (SARS-COV-2) (CORONAVIRUS DISEASE [COVID-19]), AMPLIFIED PROBE TECHNIQUE, MAKING USE OF HIGH THROUGHPUT TECHNOLOGIES AS DESCRIBED BY CMS-2020-01-R: HCPCS | Performed by: FAMILY MEDICINE

## 2021-05-12 PROCEDURE — 258N000001 HC RX 258: Performed by: FAMILY MEDICINE

## 2021-05-12 PROCEDURE — C9803 HOPD COVID-19 SPEC COLLECT: HCPCS | Performed by: FAMILY MEDICINE

## 2021-05-12 PROCEDURE — U0005 INFEC AGEN DETEC AMPLI PROBE: HCPCS | Performed by: FAMILY MEDICINE

## 2021-05-12 PROCEDURE — 83735 ASSAY OF MAGNESIUM: CPT | Performed by: FAMILY MEDICINE

## 2021-05-12 PROCEDURE — 85730 THROMBOPLASTIN TIME PARTIAL: CPT | Performed by: FAMILY MEDICINE

## 2021-05-12 PROCEDURE — HZ2ZZZZ DETOXIFICATION SERVICES FOR SUBSTANCE ABUSE TREATMENT: ICD-10-PCS | Performed by: FAMILY MEDICINE

## 2021-05-12 PROCEDURE — 80329 ANALGESICS NON-OPIOID 1 OR 2: CPT | Performed by: FAMILY MEDICINE

## 2021-05-12 PROCEDURE — 86709 HEPATITIS A IGM ANTIBODY: CPT | Performed by: FAMILY MEDICINE

## 2021-05-12 PROCEDURE — 85025 COMPLETE CBC W/AUTO DIFF WBC: CPT | Performed by: FAMILY MEDICINE

## 2021-05-12 PROCEDURE — 93975 VASCULAR STUDY: CPT | Mod: 26

## 2021-05-12 PROCEDURE — 80307 DRUG TEST PRSMV CHEM ANLYZR: CPT | Performed by: FAMILY MEDICINE

## 2021-05-12 PROCEDURE — 86706 HEP B SURFACE ANTIBODY: CPT | Performed by: FAMILY MEDICINE

## 2021-05-12 PROCEDURE — 80143 DRUG ASSAY ACETAMINOPHEN: CPT | Performed by: FAMILY MEDICINE

## 2021-05-12 PROCEDURE — 82248 BILIRUBIN DIRECT: CPT | Performed by: FAMILY MEDICINE

## 2021-05-12 PROCEDURE — 96375 TX/PRO/DX INJ NEW DRUG ADDON: CPT | Performed by: FAMILY MEDICINE

## 2021-05-12 PROCEDURE — 250N000009 HC RX 250: Performed by: FAMILY MEDICINE

## 2021-05-12 PROCEDURE — 250N000013 HC RX MED GY IP 250 OP 250 PS 637: Performed by: INTERNAL MEDICINE

## 2021-05-12 PROCEDURE — 84100 ASSAY OF PHOSPHORUS: CPT | Performed by: FAMILY MEDICINE

## 2021-05-12 PROCEDURE — 82140 ASSAY OF AMMONIA: CPT | Performed by: STUDENT IN AN ORGANIZED HEALTH CARE EDUCATION/TRAINING PROGRAM

## 2021-05-12 PROCEDURE — 86705 HEP B CORE ANTIBODY IGM: CPT | Performed by: FAMILY MEDICINE

## 2021-05-12 PROCEDURE — 250N000013 HC RX MED GY IP 250 OP 250 PS 637: Performed by: FAMILY MEDICINE

## 2021-05-12 RX ORDER — ONDANSETRON 2 MG/ML
4 INJECTION INTRAMUSCULAR; INTRAVENOUS EVERY 6 HOURS PRN
Status: DISCONTINUED | OUTPATIENT
Start: 2021-05-12 | End: 2021-05-13 | Stop reason: HOSPADM

## 2021-05-12 RX ORDER — MULTIPLE VITAMINS W/ MINERALS TAB 9MG-400MCG
1 TAB ORAL DAILY
Status: DISCONTINUED | OUTPATIENT
Start: 2021-05-12 | End: 2021-05-13

## 2021-05-12 RX ORDER — LANOLIN ALCOHOL/MO/W.PET/CERES
100 CREAM (GRAM) TOPICAL DAILY
Status: DISCONTINUED | OUTPATIENT
Start: 2021-05-12 | End: 2021-05-13

## 2021-05-12 RX ORDER — SODIUM CHLORIDE, SODIUM LACTATE, POTASSIUM CHLORIDE, CALCIUM CHLORIDE 600; 310; 30; 20 MG/100ML; MG/100ML; MG/100ML; MG/100ML
INJECTION, SOLUTION INTRAVENOUS CONTINUOUS
Status: DISCONTINUED | OUTPATIENT
Start: 2021-05-12 | End: 2021-05-13 | Stop reason: HOSPADM

## 2021-05-12 RX ORDER — LIDOCAINE 40 MG/G
CREAM TOPICAL
Status: DISCONTINUED | OUTPATIENT
Start: 2021-05-12 | End: 2021-05-13 | Stop reason: HOSPADM

## 2021-05-12 RX ORDER — LORAZEPAM 2 MG/ML
1 INJECTION INTRAMUSCULAR ONCE
Status: COMPLETED | OUTPATIENT
Start: 2021-05-12 | End: 2021-05-12

## 2021-05-12 RX ORDER — ONDANSETRON 2 MG/ML
4 INJECTION INTRAMUSCULAR; INTRAVENOUS ONCE
Status: COMPLETED | OUTPATIENT
Start: 2021-05-12 | End: 2021-05-12

## 2021-05-12 RX ORDER — ONDANSETRON 4 MG/1
4 TABLET, ORALLY DISINTEGRATING ORAL EVERY 6 HOURS PRN
Status: DISCONTINUED | OUTPATIENT
Start: 2021-05-12 | End: 2021-05-13 | Stop reason: HOSPADM

## 2021-05-12 RX ORDER — MULTIPLE VITAMINS W/ MINERALS TAB 9MG-400MCG
1 TAB ORAL DAILY
Status: DISCONTINUED | OUTPATIENT
Start: 2021-05-13 | End: 2021-05-12

## 2021-05-12 RX ORDER — PROCHLORPERAZINE MALEATE 5 MG
10 TABLET ORAL EVERY 6 HOURS PRN
Status: DISCONTINUED | OUTPATIENT
Start: 2021-05-12 | End: 2021-05-13 | Stop reason: HOSPADM

## 2021-05-12 RX ORDER — PROCHLORPERAZINE 25 MG
25 SUPPOSITORY, RECTAL RECTAL EVERY 12 HOURS PRN
Status: DISCONTINUED | OUTPATIENT
Start: 2021-05-12 | End: 2021-05-13 | Stop reason: HOSPADM

## 2021-05-12 RX ORDER — LANOLIN ALCOHOL/MO/W.PET/CERES
100 CREAM (GRAM) TOPICAL DAILY
Status: DISCONTINUED | OUTPATIENT
Start: 2021-05-13 | End: 2021-05-12

## 2021-05-12 RX ORDER — LORAZEPAM 0.5 MG/1
1-2 TABLET ORAL EVERY 30 MIN PRN
Status: DISCONTINUED | OUTPATIENT
Start: 2021-05-12 | End: 2021-05-12

## 2021-05-12 RX ORDER — FOLIC ACID 1 MG/1
1 TABLET ORAL DAILY
Status: DISCONTINUED | OUTPATIENT
Start: 2021-05-13 | End: 2021-05-12

## 2021-05-12 RX ORDER — FOLIC ACID 1 MG/1
1 TABLET ORAL DAILY
Status: DISCONTINUED | OUTPATIENT
Start: 2021-05-12 | End: 2021-05-13

## 2021-05-12 RX ORDER — LORAZEPAM 0.5 MG/1
1-2 TABLET ORAL EVERY 30 MIN PRN
Status: DISCONTINUED | OUTPATIENT
Start: 2021-05-12 | End: 2021-05-13 | Stop reason: HOSPADM

## 2021-05-12 RX ADMIN — LORAZEPAM 2 MG: 0.5 TABLET ORAL at 20:24

## 2021-05-12 RX ADMIN — LORAZEPAM 1 MG: 0.5 TABLET ORAL at 23:09

## 2021-05-12 RX ADMIN — FOLIC ACID 1 MG: 1 TABLET ORAL at 21:18

## 2021-05-12 RX ADMIN — THIAMINE HCL TAB 100 MG 100 MG: 100 TAB at 21:18

## 2021-05-12 RX ADMIN — LORAZEPAM 1 MG: 2 INJECTION INTRAMUSCULAR; INTRAVENOUS at 17:20

## 2021-05-12 RX ADMIN — FOLIC ACID: 5 INJECTION, SOLUTION INTRAMUSCULAR; INTRAVENOUS; SUBCUTANEOUS at 17:58

## 2021-05-12 RX ADMIN — ONDANSETRON 4 MG: 2 INJECTION INTRAMUSCULAR; INTRAVENOUS at 17:20

## 2021-05-12 RX ADMIN — MULTIPLE VITAMINS W/ MINERALS TAB 1 TABLET: TAB at 21:18

## 2021-05-12 ASSESSMENT — ENCOUNTER SYMPTOMS
CONFUSION: 0
TREMORS: 1
NECK STIFFNESS: 0
DIFFICULTY URINATING: 0
BLOOD IN STOOL: 0
VOMITING: 1
ABDOMINAL PAIN: 0
SHORTNESS OF BREATH: 0
EYE REDNESS: 0
NAUSEA: 1
COLOR CHANGE: 1
FEVER: 0
HEADACHES: 0
ARTHRALGIAS: 0

## 2021-05-12 ASSESSMENT — MIFFLIN-ST. JEOR
SCORE: 1935.17
SCORE: 1913.4

## 2021-05-12 ASSESSMENT — ANXIETY QUESTIONNAIRES: GAD7 TOTAL SCORE: 12

## 2021-05-12 NOTE — TELEPHONE ENCOUNTER
Team RN  Please call Tulio. His labs show that his liver is in significant distress / failure. He does need to go to the ER to be evaluated and to Detox off alcohol. As I spoke with him yesterday, the [facility] ER does Detox.     Thank you.  Donald Vallejo, MPAS, PA-C

## 2021-05-12 NOTE — TELEPHONE ENCOUNTER
Lab calling with critical value of total bilrubin. Per notes below, provider reviewed and patient notified.     Aliza Marino, RN, BSN, PHN  [facility] [place]

## 2021-05-12 NOTE — ED PROVIDER NOTES
[place] EMERGENCY DEPARTMENT ([facility])  5/12/21   History     Chief Complaint   Patient presents with     Critical Values     The history is provided by the patient and medical records.     Tulio Rosales is a 26 year old male with history of schizophrenia, alcohol use who presents to the ED with elevated liver function test values.  Patient was seen in [facility] by ARCELIA Bruno to establish cares.  He reported auditory and visual hallucinations secondary to his schizophrenia, 10-12 ounces of daily alcohol use, rash.  On physical exam he was noted to be jaundiced with scleral icterus.  He used to drink 1 L daily, had cut back his use but had been drinking on a daily basis since he was 19.  He has had one prior attempt at detox where he was sober for 6 months afterwards.  The patient was mildly jaundiced at this time.  His PA advised him to go to the ED for detox but he declined this due to financial concerns.  Labs were drawn due to concern for acute liver distress.  These labs were notable for elevated bilirubin of 16.3, up from 3.4 one year ago.  He was told to come to the ED for further evaluation.     In the ED, he states that he has had withdrawal tremors and hallucinations secondary to his schizophrenia.  He repeated that he has been consuming 10-12 ounces of 80 proof vodka daily.  The patient's last drink was 9:30 this morning.  The patient has noticed that his eyes have been more yellow.  The patient has not been sick recently been diagnosed with hepatitis.  He states that he has been vomiting but has not had any blood in his vomit or stool.  The patient states that he would be interested in detox.  Patient notes some nausea with vomiting but no reports hematemesis nurse reports of esophageal varices.  No reports of ascites.    Past Medical History  Past Medical History:   Diagnosis Date     Admitted to substance misuse detoxification center       Polyarthritis of upper arm 09/18/2019    L UE     Past Surgical History:   Procedure Laterality Date     NO HISTORY OF SURGERY       No current outpatient medications on file.    Allergies   Allergen Reactions     Oxycodone Nausea and Vomiting     Severe emesis with opioid medications     Family History  Family History   Problem Relation Age of Onset     Diabetes Mother      Diabetes Brother      Arthritis Brother         Rheumatoid     Social History   Social History     Tobacco Use     Smoking status: Never Smoker     Smokeless tobacco: Never Used   Substance Use Topics     Alcohol use: Yes     Comment: 10-12 ounces a day of 80 proof vodka     Drug use: Never      Past medical history, past surgical history, medications, allergies, family history, and social history were reviewed with the patient. No additional pertinent items.       Review of Systems   Constitutional: Positive for activity change and appetite change. Negative for fatigue and fever.   HENT: Negative for congestion, nosebleeds, sore throat and trouble swallowing.    Eyes: Negative for redness and visual disturbance.   Respiratory: Negative for cough and shortness of breath.    Cardiovascular: Negative for chest pain and leg swelling.   Gastrointestinal: Positive for nausea and vomiting (Nonbloody). Negative for abdominal pain and blood in stool.   Genitourinary: Negative for difficulty urinating and flank pain.   Musculoskeletal: Negative for arthralgias and neck stiffness.   Skin: Positive for color change (Y[place]). Negative for rash.   Allergic/Immunologic: Negative for immunocompromised state.   Neurological: Positive for tremors. Negative for seizures, speech difficulty, light-headedness, numbness and headaches.   Hematological: Does not bruise/bleed easily.   Psychiatric/Behavioral: Positive for decreased concentration, dysphoric mood and hallucinations (reported due to schizo d/o). Negative for confusion and sleep disturbance. The patient  "is nervous/anxious.    All other systems reviewed and are negative.    A complete review of systems was performed with pertinent positives and negatives noted in the HPI, and all other systems negative.    Physical Exam   BP: (!) 150/85  Pulse: 105  Temp: 98.3  F (36.8  C)  Resp: 16  Height: 180.3 cm (5' 11\")  Weight: 91.6 kg (202 lb)  SpO2: 94 %  Physical Exam  Vitals signs and nursing note reviewed.   Constitutional:       General: He is in acute distress.      Appearance: He is well-developed. He is ill-appearing. He is not toxic-appearing or diaphoretic.      Comments: Patient ER is displaying marked scleral icterus with jaundice noted is nauseated without hematemesis.  Has tremor noted bilateral but otherwise cooperative mild anxiousness mild diaphoresis.  Nontoxic but ill-appearing is cooperative not confused   HENT:      Head: Normocephalic and atraumatic.      Nose: Nose normal.      Mouth/Throat:      Mouth: Mucous membranes are moist.      Pharynx: Oropharynx is clear.   Eyes:      General: Scleral icterus present.      Extraocular Movements: Extraocular movements intact.      Pupils: Pupils are equal, round, and reactive to light.   Neck:      Musculoskeletal: Normal range of motion and neck supple. No neck rigidity.   Cardiovascular:      Rate and Rhythm: Regular rhythm. Tachycardia present.   Pulmonary:      Effort: No respiratory distress.      Breath sounds: No stridor.   Abdominal:      General: There is no distension.      Palpations: There is no mass.      Tenderness: There is no abdominal tenderness.      Comments: Abdomen is nondistended nonrigid.  No marked tenderness   Musculoskeletal:         General: No swelling or tenderness.   Skin:     General: Skin is warm and dry.      Capillary Refill: Capillary refill takes less than 2 seconds.      Coloration: Skin is jaundiced. Skin is not pale.      Findings: No erythema or rash.   Neurological:      General: No focal deficit present.      Mental " Status: He is alert and oriented to person, place, and time. Mental status is at baseline.   Psychiatric:      Comments: Patient some increasing anxiousness noted with tremor noted questionable reflective of alcohol withdrawal most likely         ED Course       Patient evaluated here in the ER.  An IV was established.  Patient did receive Ativan IV which did help his nausea and some tremulousness.  Patient also given Zofran.  Patient given a banana bag also IV.  Labs have been drawn reviewed.  Urinalysis reveals some red cells noted but no sign of infection.  Drug screen positive for alcohol only.  Hepatitis labs were sent.  White count 6.7 hemoglobin 13.2.  Platelets 107.  INR 1.38.  Alcohol level 0.20.  Magnesium was 2.  Patient's total bilirubin was 17 with a direct bilirubin of 13.1.  Sodium 136 potassium 3.8.  Bicarb 24 anion gap is 8 glucose is 98.  Creatinine 0.51.  Patient's ALT is 61 but AST is 239 and alk phos is 254.    Discussed case with GI here in the ER.  We did order the hepatitis screening also right upper quadrant ultrasound was done with Doppler.  The findings of this did not show obvious thrombosis seen there is some changes noted.  Patient did receive Ativan per [facility] protocol.    Reassessed patient several times he is comfortable coming the hospital for further work-up also with alcohol withdrawal treatment.    Discussed with medicine they will accept the patient patient is been cooperative here in the ER.  Signed out to evening physician.    5:05 PM  The patient was seen and examined by Brock Bell MD in Room ED27.   Procedures             EKG Interpretation:      Interpreted by Brock Bell MD  Time reviewed: 0043  Symptoms at time of EKG: tremor withdrawal   Rhythm: sinus tach  Rate:103  Axis: normal  Ectopy: none  Conduction: normal  ST Segments/ T Waves: No hyperacute ST-T wave changes  Q Waves: none  Comparison to prior: No old EKG available    Clinical Impression: normal  EKG             Results for orders placed or performed during the hospital encounter of 05/12/21   US Abdomen Limited w Doppler Complete     Status: None    Narrative    EXAMINATION: US ABDOMEN LIMITED WITH DOPPLER COMPLETE 5/12/2021 9:22  PM     COMPARISON: None.    HISTORY: Acute liver failure, eval with Doppler    TECHNIQUE: The right upper quadrant was scanned in standard fashion  with specialized ultrasound transducer(s) using both gray-scale, color  Doppler, and spectral flow techniques.    Findings:  Liver: The liver is enlarged, measuring 18.6 cm, with increased  echogenicity of the hepatic parenchyma. No evidence of a focal hepatic  mass. The main portal vein measures 1.3 cm in diameter.    Extrahepatic portal vein flow is antegrade at 20 cm/s.  Right portal vein flow is antegrade, measuring 10 cm/s.  Left portal vein flow is antegrade, measuring 10 cm/s.    Flow in the hepatic artery is towards the liver and:  123 cm/s peak systolic velocity  0.65 resistive index.     The splenic vein is nonvisualized.  The left and right hepatic veins  are not visualized. The middle middle hepatic vein is patent with flow  towards the IVC. The IVC is patent with flow towards the heart. The  visualized aorta is not dilated.    Gallbladder: Small amount of fluid around the gallbladder, nonspecific  in the setting of intrinsic hepatic parenchymal disease. No  gallbladder wall thickening, cholelithiasis, or positive sonographic  Mckee's sign.    Bile Ducts: Both the intra- and extrahepatic biliary system are of  normal caliber.  The common bile duct is not visualized.    Pancreas: Pancreas is not visualized due to overlying bowel gas    Kidneys: The right kidney measures 13.8 cm and demonstrates normal  echotexture, without mass or hydronephrosis.      Fluid: No evidence of ascites or pleural effusions.      Impression    Impression:   1.  Hepatomegaly with echogenic hepatic parenchyma, suggestive of  intrinsic hepatic  parenchymal disease.   2.  Patent Doppler evaluation of the hepatic vasculature, with the  exception of the left and right hepatic veins which are not  visualized.  3.  Small amount of fluid around the gallbladder, nonspecific in the  setting of intrinsic hepatic parenchymal disease.    I have personally reviewed the examination and initial interpretation  and I agree with the findings.    KINA HENRIQUEZ MD   CBC with platelets differential     Status: Abnormal   Result Value Ref Range    WBC 6.7 4.0 - 11.0 10e9/L    RBC Count 3.96 (L) 4.4 - 5.9 10e12/L    Hemoglobin 13.2 (L) 13.3 - 17.7 g/dL    Hematocrit 37.0 (L) 40.0 - 53.0 %    MCV 93 78 - 100 fl    MCH 33.3 (H) 26.5 - 33.0 pg    MCHC 35.7 31.5 - 36.5 g/dL    RDW 21.2 (H) 10.0 - 15.0 %    Platelet Count 107 (L) 150 - 450 10e9/L    Diff Method Automated Method     % Neutrophils 65.1 %    % Lymphocytes 16.4 %    % Monocytes 15.3 %    % Eosinophils 1.2 %    % Basophils 1.7 %    % Immature Granulocytes 0.3 %    Nucleated RBCs 0 0 /100    Absolute Neutrophil 4.3 1.6 - 8.3 10e9/L    Absolute Lymphocytes 1.1 0.8 - 5.3 10e9/L    Absolute Monocytes 1.0 0.0 - 1.3 10e9/L    Absolute Eosinophils 0.1 0.0 - 0.7 10e9/L    Absolute Basophils 0.1 0.0 - 0.2 10e9/L    Abs Immature Granulocytes 0.0 0 - 0.4 10e9/L    Absolute Nucleated RBC 0.0    Comprehensive metabolic panel     Status: Abnormal   Result Value Ref Range    Sodium 136 133 - 144 mmol/L    Potassium 3.8 3.4 - 5.3 mmol/L    Chloride 105 94 - 109 mmol/L    Carbon Dioxide 24 20 - 32 mmol/L    Anion Gap 8 3 - 14 mmol/L    Glucose 98 70 - 99 mg/dL    Urea Nitrogen 6 (L) 7 - 30 mg/dL    Creatinine 0.51 (L) 0.66 - 1.25 mg/dL    GFR Estimate >90 >60 mL/min/[1.73_m2]    GFR Estimate If Black >90 >60 mL/min/[1.73_m2]    Calcium 8.6 8.5 - 10.1 mg/dL    Bilirubin Total 17.0 (HH) 0.2 - 1.3 mg/dL    Albumin 2.9 (L) 3.4 - 5.0 g/dL    Protein Total 7.7 6.8 - 8.8 g/dL    Alkaline Phosphatase 254 (H) 40 - 150 U/L    ALT 61 0 - 70 U/L      (H) 0 - 45 U/L   Drug abuse screen 6 urine (chem dep) ([facility])     Status: Abnormal   Result Value Ref Range    Amphetamine Qual Urine Negative NEG^Negative    Barbiturates Qual Urine Negative NEG^Negative    Benzodiazepine Qual Urine Negative NEG^Negative    Cannabinoids Qual Urine Negative NEG^Negative    Cocaine Qual Urine Negative NEG^Negative    Ethanol Qual Urine Positive (A) NEG^Negative    Opiates Qualitative Urine Negative NEG^Negative   UA reflex to Microscopic and Culture     Status: Abnormal    Specimen: Urine clean catch; Midstream Urine   Result Value Ref Range    Color Urine Dark Yellow     Appearance Urine Slightly Cloudy     Glucose Urine Negative NEG^Negative mg/dL    Bilirubin Urine Large (A) NEG^Negative    Ketones Urine Negative NEG^Negative mg/dL    Specific Gravity Urine 1.018 1.003 - 1.035    Blood Urine Moderate (A) NEG^Negative    pH Urine 6.5 5.0 - 7.0 pH    Protein Albumin Urine 100 (A) NEG^Negative mg/dL    Urobilinogen mg/dL 4.0 (H) 0.0 - 2.0 mg/dL    Nitrite Urine Negative NEG^Negative    Leukocyte Esterase Urine Negative NEG^Negative    Source Midstream Urine     RBC Urine 16 (H) 0 - 2 /HPF    WBC Urine 2 0 - 5 /HPF    Squamous Epithelial /HPF Urine 0 0 - 1 /HPF    Mucous Urine Present (A) NEG^Negative /LPF    Hyaline Casts 9 (H) 0 - 2 /LPF   Acetaminophen level     Status: None   Result Value Ref Range    Acetaminophen Level Canceled, Test credited mg/L   Bilirubin direct     Status: Abnormal   Result Value Ref Range    Bilirubin Direct 13.1 (H) 0.0 - 0.2 mg/dL   Alcohol ethyl     Status: Abnormal   Result Value Ref Range    Ethanol g/dL 0.20 (H) <0.01 g/dL   INR     Status: Abnormal   Result Value Ref Range    INR 1.38 (H) 0.86 - 1.14   Magnesium     Status: None   Result Value Ref Range    Magnesium 2.0 1.6 - 2.3 mg/dL   Partial thromboplastin time     Status: Abnormal   Result Value Ref Range    PTT 40 (H) 22 - 37 sec   Asymptomatic SARS-CoV-2 COVID-19 Virus (Coronavirus)  by PCR     Status: None    Specimen: Nasopharyngeal   Result Value Ref Range    SARS-CoV-2 Virus Specimen Source Nasopharyngeal     SARS-CoV-2 PCR Result NEGATIVE     SARS-CoV-2 PCR Comment       Testing was performed using the [employer] Xpress SARS-CoV-2 Assay on the Cepheid Gene-Xpert   Instrument Systems. Additional information about this Emergency Use Authorization (EUA)   assay can be found via the Lab Guide.     Ammonia     Status: Abnormal   Result Value Ref Range    Ammonia <10 (L) 10 - 50 umol/L   Phosphorus     Status: None   Result Value Ref Range    Phosphorus 2.9 2.5 - 4.5 mg/dL   Acetaminophen level     Status: None   Result Value Ref Range    Acetaminophen Level Canceled, Test credited mg/L   Acetaminophen     Status: Abnormal   Result Value Ref Range    Acetaminophen <5.0 (L) 10.0 - 30.0 ug/mL   Comprehensive metabolic panel     Status: Abnormal   Result Value Ref Range    Sodium 135 133 - 144 mmol/L    Potassium 3.6 3.4 - 5.3 mmol/L    Chloride 105 94 - 109 mmol/L    Carbon Dioxide 24 20 - 32 mmol/L    Anion Gap 6 3 - 14 mmol/L    Glucose 81 70 - 99 mg/dL    Urea Nitrogen 4 (L) 7 - 30 mg/dL    Creatinine 0.43 (L) 0.66 - 1.25 mg/dL    GFR Estimate >90 >60 mL/min/[1.73_m2]    GFR Estimate If Black >90 >60 mL/min/[1.73_m2]    Calcium 8.8 8.5 - 10.1 mg/dL    Bilirubin Total 18.6 (HH) 0.2 - 1.3 mg/dL    Albumin 2.6 (L) 3.4 - 5.0 g/dL    Protein Total 7.0 6.8 - 8.8 g/dL    Alkaline Phosphatase 163 (H) 40 - 150 U/L    ALT 54 0 - 70 U/L     (H) 0 - 45 U/L   CBC with platelets     Status: Abnormal   Result Value Ref Range    WBC 6.7 4.0 - 11.0 10e9/L    RBC Count 3.76 (L) 4.4 - 5.9 10e12/L    Hemoglobin 12.4 (L) 13.3 - 17.7 g/dL    Hematocrit 35.8 (L) 40.0 - 53.0 %    MCV 95 78 - 100 fl    MCH 33.0 26.5 - 33.0 pg    MCHC 34.6 31.5 - 36.5 g/dL    RDW 21.5 (H) 10.0 - 15.0 %    Platelet Count 99 (L) 150 - 450 10e9/L   INR     Status: Abnormal   Result Value Ref Range    INR 1.37 (H) 0.86 - 1.14   TSH with  free T4 reflex     Status: None   Result Value Ref Range    TSH 2.89 0.40 - 4.00 mU/L   EKG 12-lead, complete     Status: None (Preliminary result)   Result Value Ref Range    Interpretation ECG Click View Image link to view waveform and result    Blood culture     Status: None (Preliminary result)    Specimen: Blood    Right Arm   Result Value Ref Range    Specimen Description Blood Right Arm     Culture Micro PENDING      Medications   LORazepam (ATIVAN) tablet 1-2 mg (2 mg Oral Given 5/13/21 0842)   lidocaine 1 % 0.1-1 mL (has no administration in time range)   lidocaine (LMX4) cream (has no administration in time range)   sodium chloride (PF) 0.9% PF flush 3 mL (3 mLs Intracatheter Not Given 5/13/21 0844)   sodium chloride (PF) 0.9% PF flush 3 mL (has no administration in time range)   melatonin tablet 1 mg (1 mg Oral Given 5/13/21 0205)   ondansetron (ZOFRAN-ODT) ODT tab 4 mg ( Oral Held by provider 5/13/21 1019)     Or   ondansetron (ZOFRAN) injection 4 mg ( Intravenous Held by provider 5/13/21 1019)   prochlorperazine (COMPAZINE) injection 10 mg (has no administration in time range)     Or   prochlorperazine (COMPAZINE) tablet 10 mg (has no administration in time range)     Or   prochlorperazine (COMPAZINE) suppository 25 mg (has no administration in time range)   lactated ringers infusion ( Intravenous New Bag 5/13/21 0046)   thiamine (B-1) 500 mg in sodium chloride 0.9 % 50 mL intermittent infusion (500 mg Intravenous New Bag 5/13/21 0832)     Followed by   thiamine (B-1) 250 mg in sodium chloride 0.9 % 50 mL intermittent infusion (has no administration in time range)     Followed by   thiamine (B-1) tablet 100 mg (has no administration in time range)   sodium chloride 0.9 % 1,000 mL with Infuvite Adult 10 mL, folic acid 1 mg infusion (has no administration in time range)   omeprazole (priLOSEC) CR capsule 20 mg (20 mg Oral Given 5/13/21 0917)   scopolamine (TRANSDERM) 72 hr patch 1 patch (has no  administration in time range)     And   scopolamine (TRANSDERM-SCOP) Patch in Place (has no administration in time range)   LORazepam (ATIVAN) injection 1 mg (1 mg Intravenous Given 5/12/21 1720)   ondansetron (ZOFRAN) injection 4 mg (4 mg Intravenous Given 5/12/21 1720)   dextrose 5% and 0.45% NaCl 1,000 mL with Infuvite Adult 10 mL, thiamine 100 mg, folic acid 1 mg, magnesium sulfate 2 g infusion ( Intravenous Stopped 5/12/21 2022)        Assessments & Plan (with Medical Decision Making)  Try 6-year-old male presents emergency room with increasing jaundice.  Somewhat acute onset.  Patient does drink has significant withdrawal has underlying mental health issues also.  Patient here in the ER shows alcohol drawl symptoms alcohol level of 0.2 patient treated with IV Ativan IV fluids along with then oral withdrawal protocol.  Patient total bilirubin was 17.  AST elevated along with alk phos slightly.  Discussed with GI regarding ultrasound Doppler did not show thrombosis.  Plan at this point patient most likely has alcoholic acute hepatitis along with alcohol withdrawal.  Will be admitted to medicine for further work-up patient agrees stable.         I have reviewed the nursing notes. I have reviewed the findings, diagnosis, plan and need for follow up with the patient.    There are no discharge medications for this patient.      Final diagnoses:   Alcohol dependence with withdrawal with complication (H)   Acute hepatitis   Jaundice       --  I, Kevin Huynh, am serving as a trained medical scribe to document services personally performed by Brock Bell MD, based on the provider's statements to me.     I, Brock Bell MD, was physically present and have reviewed and verified the accuracy of this note documented by Kevin Huynh.    Brock Bell MD  [facility] EMERGENCY DEPARTMENT  5/12/2021    This note was created at least in part by the use of dragon voice dictation system. Inadvertent  typographical errors may still exist.  Brock Bell MD.    Patient evaluated in the emergency department during the COVID-19 pandemic period. Careful attention to patients safety was addressed throughout the evaluation. Evaluation and treatment management was initiated with disposition made efficiently and appropriate as possible to minimize any risk of potential exposure to patient during this evaluation.       Brock Bell MD  05/13/21 1047

## 2021-05-12 NOTE — ED TRIAGE NOTES
"Had blood work drawn yesterday and called today directing him to ED. He was told he had \"liver failure\".   "

## 2021-05-13 ENCOUNTER — PATIENT OUTREACH (OUTPATIENT)
Dept: CARE COORDINATION | Facility: CLINIC | Age: 26
End: 2021-05-13

## 2021-05-13 VITALS
HEART RATE: 101 BPM | TEMPERATURE: 98.1 F | SYSTOLIC BLOOD PRESSURE: 153 MMHG | OXYGEN SATURATION: 97 % | BODY MASS INDEX: 29.17 KG/M2 | HEIGHT: 71 IN | DIASTOLIC BLOOD PRESSURE: 84 MMHG | WEIGHT: 208.34 LBS | RESPIRATION RATE: 20 BRPM

## 2021-05-13 LAB
ALBUMIN SERPL-MCNC: 2.6 G/DL (ref 3.4–5)
ALP SERPL-CCNC: 163 U/L (ref 40–150)
ALT SERPL W P-5'-P-CCNC: 54 U/L (ref 0–70)
ANION GAP SERPL CALCULATED.3IONS-SCNC: 6 MMOL/L (ref 3–14)
APAP SERPL-MCNC: <5 UG/ML (ref 10–30)
AST SERPL W P-5'-P-CCNC: 209 U/L (ref 0–45)
BASOPHILS # BLD AUTO: 0.1 10E9/L (ref 0–0.2)
BASOPHILS # BLD AUTO: 0.1 10E9/L (ref 0–0.2)
BASOPHILS NFR BLD AUTO: 1.2 %
BASOPHILS NFR BLD AUTO: 1.6 %
BILIRUB SERPL-MCNC: 18.6 MG/DL (ref 0.2–1.3)
BUN SERPL-MCNC: 4 MG/DL (ref 7–30)
CALCIUM SERPL-MCNC: 8.8 MG/DL (ref 8.5–10.1)
CHLORIDE SERPL-SCNC: 105 MMOL/L (ref 94–109)
CO2 SERPL-SCNC: 24 MMOL/L (ref 20–32)
CREAT SERPL-MCNC: 0.43 MG/DL (ref 0.66–1.25)
CRP SERPL-MCNC: 20 MG/L (ref 0–8)
DIFFERENTIAL METHOD BLD: ABNORMAL
DIFFERENTIAL METHOD BLD: NORMAL
EOSINOPHIL # BLD AUTO: 0.1 10E9/L (ref 0–0.7)
EOSINOPHIL # BLD AUTO: 0.1 10E9/L (ref 0–0.7)
EOSINOPHIL NFR BLD AUTO: 0.7 %
EOSINOPHIL NFR BLD AUTO: 2 %
ERYTHROCYTE [DISTWIDTH] IN BLOOD BY AUTOMATED COUNT: 21.4 % (ref 10–15)
ERYTHROCYTE [DISTWIDTH] IN BLOOD BY AUTOMATED COUNT: 21.5 % (ref 10–15)
FERRITIN SERPL-MCNC: 704 NG/ML (ref 26–388)
GFR SERPL CREATININE-BSD FRML MDRD: >90 ML/MIN/{1.73_M2}
GLUCOSE SERPL-MCNC: 81 MG/DL (ref 70–99)
HAV IGM SERPL QL IA: NONREACTIVE
HBV CORE IGM SERPL QL IA: NONREACTIVE
HBV SURFACE AB SERPL IA-ACNC: 0.09 M[IU]/ML
HBV SURFACE AG SERPL QL IA: NONREACTIVE
HCT VFR BLD AUTO: 35.8 % (ref 40–53)
HCT VFR BLD AUTO: 36.6 % (ref 40–53)
HCV AB SERPL QL IA: NONREACTIVE
HGB BLD-MCNC: 12.4 G/DL (ref 13.3–17.7)
HGB BLD-MCNC: 12.9 G/DL (ref 13.3–17.7)
HIV 1+2 AB+HIV1 P24 AG SERPL QL IA: NONREACTIVE
IMM GRANULOCYTES # BLD: 0 10E9/L (ref 0–0.4)
IMM GRANULOCYTES # BLD: 0 10E9/L (ref 0–0.4)
IMM GRANULOCYTES NFR BLD: 0.4 %
IMM GRANULOCYTES NFR BLD: 0.6 %
INR PPP: 1.37 (ref 0.86–1.14)
INTERPRETATION ECG - MUSE: NORMAL
IRON SATN MFR SERPL: 102 % (ref 15–46)
IRON SERPL-MCNC: 153 UG/DL (ref 35–180)
LACTATE BLD-SCNC: 1.1 MMOL/L (ref 0.7–2)
LIPASE SERPL-CCNC: 237 U/L (ref 73–393)
LYMPHOCYTES # BLD AUTO: 0.7 10E9/L (ref 0.8–5.3)
LYMPHOCYTES # BLD AUTO: 1.2 10E9/L (ref 0.8–5.3)
LYMPHOCYTES NFR BLD AUTO: 10.9 %
LYMPHOCYTES NFR BLD AUTO: 16.7 %
MCH RBC QN AUTO: 33 PG (ref 26.5–33)
MCH RBC QN AUTO: 34.3 PG (ref 26.5–33)
MCHC RBC AUTO-ENTMCNC: 34.6 G/DL (ref 31.5–36.5)
MCHC RBC AUTO-ENTMCNC: 35.2 G/DL (ref 31.5–36.5)
MCV RBC AUTO: 95 FL (ref 78–100)
MCV RBC AUTO: 97 FL (ref 78–100)
MONOCYTES # BLD AUTO: 1 10E9/L (ref 0–1.3)
MONOCYTES # BLD AUTO: 1.1 10E9/L (ref 0–1.3)
MONOCYTES NFR BLD AUTO: 14.5 %
MONOCYTES NFR BLD AUTO: 15.7 %
NEUTROPHILS # BLD AUTO: 4.4 10E9/L (ref 1.6–8.3)
NEUTROPHILS # BLD AUTO: 4.8 10E9/L (ref 1.6–8.3)
NEUTROPHILS NFR BLD AUTO: 63.6 %
NEUTROPHILS NFR BLD AUTO: 72.1 %
NRBC # BLD AUTO: 0 10*3/UL
NRBC # BLD AUTO: 0 10*3/UL
NRBC BLD AUTO-RTO: 0 /100
NRBC BLD AUTO-RTO: 0 /100
PLATELET # BLD AUTO: 97 10E9/L (ref 150–450)
PLATELET # BLD AUTO: 99 10E9/L (ref 150–450)
POTASSIUM SERPL-SCNC: 3.6 MMOL/L (ref 3.4–5.3)
PROCALCITONIN SERPL-MCNC: 0.25 NG/ML
PROT SERPL-MCNC: 7 G/DL (ref 6.8–8.8)
RBC # BLD AUTO: 3.76 10E12/L (ref 4.4–5.9)
RBC # BLD AUTO: 3.76 10E12/L (ref 4.4–5.9)
RETICS # AUTO: 106.9 10E9/L (ref 25–95)
RETICS # AUTO: 110.8 10E9/L (ref 25–95)
RETICS/RBC NFR AUTO: 2.8 % (ref 0.5–2)
RETICS/RBC NFR AUTO: 3 % (ref 0.5–2)
SODIUM SERPL-SCNC: 135 MMOL/L (ref 133–144)
T PALLIDUM AB SER QL: NONREACTIVE
TIBC SERPL-MCNC: 150 UG/DL (ref 240–430)
TSH SERPL DL<=0.005 MIU/L-ACNC: 2.89 MU/L (ref 0.4–4)
WBC # BLD AUTO: 6.7 10E9/L (ref 4–11)
WBC # BLD AUTO: 6.7 10E9/L (ref 4–11)

## 2021-05-13 PROCEDURE — 87389 HIV-1 AG W/HIV-1&-2 AB AG IA: CPT | Performed by: STUDENT IN AN ORGANIZED HEALTH CARE EDUCATION/TRAINING PROGRAM

## 2021-05-13 PROCEDURE — 258N000003 HC RX IP 258 OP 636: Performed by: STUDENT IN AN ORGANIZED HEALTH CARE EDUCATION/TRAINING PROGRAM

## 2021-05-13 PROCEDURE — 82728 ASSAY OF FERRITIN: CPT | Performed by: STUDENT IN AN ORGANIZED HEALTH CARE EDUCATION/TRAINING PROGRAM

## 2021-05-13 PROCEDURE — 99222 1ST HOSP IP/OBS MODERATE 55: CPT | Mod: AI | Performed by: STUDENT IN AN ORGANIZED HEALTH CARE EDUCATION/TRAINING PROGRAM

## 2021-05-13 PROCEDURE — 83550 IRON BINDING TEST: CPT | Performed by: STUDENT IN AN ORGANIZED HEALTH CARE EDUCATION/TRAINING PROGRAM

## 2021-05-13 PROCEDURE — 82525 ASSAY OF COPPER: CPT | Performed by: STUDENT IN AN ORGANIZED HEALTH CARE EDUCATION/TRAINING PROGRAM

## 2021-05-13 PROCEDURE — 250N000013 HC RX MED GY IP 250 OP 250 PS 637: Performed by: STUDENT IN AN ORGANIZED HEALTH CARE EDUCATION/TRAINING PROGRAM

## 2021-05-13 PROCEDURE — 36415 COLL VENOUS BLD VENIPUNCTURE: CPT | Performed by: STUDENT IN AN ORGANIZED HEALTH CARE EDUCATION/TRAINING PROGRAM

## 2021-05-13 PROCEDURE — 99222 1ST HOSP IP/OBS MODERATE 55: CPT | Performed by: NURSE PRACTITIONER

## 2021-05-13 PROCEDURE — 85045 AUTOMATED RETICULOCYTE COUNT: CPT | Performed by: STUDENT IN AN ORGANIZED HEALTH CARE EDUCATION/TRAINING PROGRAM

## 2021-05-13 PROCEDURE — 93010 ELECTROCARDIOGRAM REPORT: CPT | Performed by: INTERNAL MEDICINE

## 2021-05-13 PROCEDURE — 250N000011 HC RX IP 250 OP 636: Performed by: STUDENT IN AN ORGANIZED HEALTH CARE EDUCATION/TRAINING PROGRAM

## 2021-05-13 PROCEDURE — 85025 COMPLETE CBC W/AUTO DIFF WBC: CPT | Performed by: STUDENT IN AN ORGANIZED HEALTH CARE EDUCATION/TRAINING PROGRAM

## 2021-05-13 PROCEDURE — 86780 TREPONEMA PALLIDUM: CPT | Performed by: STUDENT IN AN ORGANIZED HEALTH CARE EDUCATION/TRAINING PROGRAM

## 2021-05-13 PROCEDURE — 999N001086 HC STATISTIC MORPHOLOGY W/INTERP HEMEPATH TC 85060: Performed by: STUDENT IN AN ORGANIZED HEALTH CARE EDUCATION/TRAINING PROGRAM

## 2021-05-13 PROCEDURE — 84443 ASSAY THYROID STIM HORMONE: CPT | Performed by: STUDENT IN AN ORGANIZED HEALTH CARE EDUCATION/TRAINING PROGRAM

## 2021-05-13 PROCEDURE — 83540 ASSAY OF IRON: CPT | Performed by: STUDENT IN AN ORGANIZED HEALTH CARE EDUCATION/TRAINING PROGRAM

## 2021-05-13 PROCEDURE — 85027 COMPLETE CBC AUTOMATED: CPT | Performed by: STUDENT IN AN ORGANIZED HEALTH CARE EDUCATION/TRAINING PROGRAM

## 2021-05-13 PROCEDURE — 84145 PROCALCITONIN (PCT): CPT | Performed by: STUDENT IN AN ORGANIZED HEALTH CARE EDUCATION/TRAINING PROGRAM

## 2021-05-13 PROCEDURE — 82390 ASSAY OF CERULOPLASMIN: CPT | Performed by: STUDENT IN AN ORGANIZED HEALTH CARE EDUCATION/TRAINING PROGRAM

## 2021-05-13 PROCEDURE — 85060 BLOOD SMEAR INTERPRETATION: CPT | Performed by: PATHOLOGY

## 2021-05-13 PROCEDURE — 85610 PROTHROMBIN TIME: CPT | Performed by: STUDENT IN AN ORGANIZED HEALTH CARE EDUCATION/TRAINING PROGRAM

## 2021-05-13 PROCEDURE — 85004 AUTOMATED DIFF WBC COUNT: CPT | Performed by: STUDENT IN AN ORGANIZED HEALTH CARE EDUCATION/TRAINING PROGRAM

## 2021-05-13 PROCEDURE — 86140 C-REACTIVE PROTEIN: CPT | Performed by: STUDENT IN AN ORGANIZED HEALTH CARE EDUCATION/TRAINING PROGRAM

## 2021-05-13 PROCEDURE — 80053 COMPREHEN METABOLIC PANEL: CPT | Performed by: STUDENT IN AN ORGANIZED HEALTH CARE EDUCATION/TRAINING PROGRAM

## 2021-05-13 PROCEDURE — 93005 ELECTROCARDIOGRAM TRACING: CPT

## 2021-05-13 PROCEDURE — 83605 ASSAY OF LACTIC ACID: CPT | Performed by: STUDENT IN AN ORGANIZED HEALTH CARE EDUCATION/TRAINING PROGRAM

## 2021-05-13 PROCEDURE — 250N000009 HC RX 250: Performed by: STUDENT IN AN ORGANIZED HEALTH CARE EDUCATION/TRAINING PROGRAM

## 2021-05-13 PROCEDURE — 87040 BLOOD CULTURE FOR BACTERIA: CPT | Performed by: STUDENT IN AN ORGANIZED HEALTH CARE EDUCATION/TRAINING PROGRAM

## 2021-05-13 PROCEDURE — 83690 ASSAY OF LIPASE: CPT | Performed by: STUDENT IN AN ORGANIZED HEALTH CARE EDUCATION/TRAINING PROGRAM

## 2021-05-13 RX ORDER — LANOLIN ALCOHOL/MO/W.PET/CERES
100 CREAM (GRAM) TOPICAL DAILY
Status: DISCONTINUED | OUTPATIENT
Start: 2021-05-20 | End: 2021-05-13 | Stop reason: HOSPADM

## 2021-05-13 RX ORDER — SCOLOPAMINE TRANSDERMAL SYSTEM 1 MG/1
1 PATCH, EXTENDED RELEASE TRANSDERMAL
Status: DISCONTINUED | OUTPATIENT
Start: 2021-05-13 | End: 2021-05-13 | Stop reason: HOSPADM

## 2021-05-13 RX ORDER — LORAZEPAM 2 MG/ML
INJECTION INTRAMUSCULAR
Status: CANCELLED | OUTPATIENT
Start: 2021-05-13

## 2021-05-13 RX ADMIN — LORAZEPAM 2 MG: 0.5 TABLET ORAL at 15:35

## 2021-05-13 RX ADMIN — LORAZEPAM 1 MG: 0.5 TABLET ORAL at 12:35

## 2021-05-13 RX ADMIN — LORAZEPAM 1 MG: 0.5 TABLET ORAL at 04:36

## 2021-05-13 RX ADMIN — OMEPRAZOLE 20 MG: 20 CAPSULE, DELAYED RELEASE ORAL at 09:17

## 2021-05-13 RX ADMIN — THIAMINE HYDROCHLORIDE 500 MG: 100 INJECTION, SOLUTION INTRAMUSCULAR; INTRAVENOUS at 08:32

## 2021-05-13 RX ADMIN — LORAZEPAM 2 MG: 0.5 TABLET ORAL at 08:42

## 2021-05-13 RX ADMIN — PROCHLORPERAZINE EDISYLATE 10 MG: 5 INJECTION INTRAMUSCULAR; INTRAVENOUS at 11:42

## 2021-05-13 RX ADMIN — THIAMINE HYDROCHLORIDE 500 MG: 100 INJECTION, SOLUTION INTRAMUSCULAR; INTRAVENOUS at 13:41

## 2021-05-13 RX ADMIN — LORAZEPAM 1 MG: 0.5 TABLET ORAL at 16:53

## 2021-05-13 RX ADMIN — SODIUM CHLORIDE, POTASSIUM CHLORIDE, SODIUM LACTATE AND CALCIUM CHLORIDE: 600; 310; 30; 20 INJECTION, SOLUTION INTRAVENOUS at 00:46

## 2021-05-13 RX ADMIN — LORAZEPAM 1 MG: 0.5 TABLET ORAL at 11:57

## 2021-05-13 RX ADMIN — ONDANSETRON 4 MG: 2 INJECTION INTRAMUSCULAR; INTRAVENOUS at 08:29

## 2021-05-13 RX ADMIN — SCOPALAMINE 1 PATCH: 1 PATCH, EXTENDED RELEASE TRANSDERMAL at 11:39

## 2021-05-13 RX ADMIN — LORAZEPAM 2 MG: 0.5 TABLET ORAL at 13:41

## 2021-05-13 RX ADMIN — LORAZEPAM 1 MG: 0.5 TABLET ORAL at 14:42

## 2021-05-13 RX ADMIN — LORAZEPAM 1 MG: 0.5 TABLET ORAL at 02:05

## 2021-05-13 RX ADMIN — LORAZEPAM 1 MG: 0.5 TABLET ORAL at 00:45

## 2021-05-13 RX ADMIN — Medication 1 MG: at 02:05

## 2021-05-13 ASSESSMENT — ENCOUNTER SYMPTOMS
HEADACHES: 0
BRUISES/BLEEDS EASILY: 0
SLEEP DISTURBANCE: 0
DECREASED CONCENTRATION: 1
NERVOUS/ANXIOUS: 1
TREMORS: 1
FLANK PAIN: 0
FATIGUE: 0
LIGHT-HEADEDNESS: 1
SPEECH DIFFICULTY: 0
DYSPHORIC MOOD: 1
SEIZURES: 0
CHILLS: 0
DIZZINESS: 1
ACTIVITY CHANGE: 1
NUMBNESS: 0
FEVER: 0
SHORTNESS OF BREATH: 0
SORE THROAT: 0
HALLUCINATIONS: 1
COUGH: 0
APPETITE CHANGE: 1
LIGHT-HEADEDNESS: 0
VOMITING: 1
NAUSEA: 1
ABDOMINAL PAIN: 1
TROUBLE SWALLOWING: 0
BLOOD IN STOOL: 1
FATIGUE: 1

## 2021-05-13 ASSESSMENT — ACTIVITIES OF DAILY LIVING (ADL)
NUMBER_OF_TIMES_PATIENT_HAS_FALLEN_WITHIN_LAST_SIX_MONTHS: 1
DIFFICULTY_EATING/SWALLOWING: NO
WALKING_OR_CLIMBING_STAIRS_DIFFICULTY: NO
ADLS_ACUITY_SCORE: 15
DRESSING/BATHING_DIFFICULTY: NO
CONCENTRATING,_REMEMBERING_OR_MAKING_DECISIONS_DIFFICULTY: NO
WEAR_GLASSES_OR_BLIND: NO
ADLS_ACUITY_SCORE: 18
ADLS_ACUITY_SCORE: 19
DIFFICULTY_COMMUNICATING: NO
FALL_HISTORY_WITHIN_LAST_SIX_MONTHS: YES
ADLS_ACUITY_SCORE: 19
DOING_ERRANDS_INDEPENDENTLY_DIFFICULTY: NO
TOILETING_ISSUES: NO
ADLS_ACUITY_SCORE: 19

## 2021-05-13 ASSESSMENT — MIFFLIN-ST. JEOR: SCORE: 1942.13

## 2021-05-13 NOTE — PHARMACY-ADMISSION MEDICATION HISTORY
Admission Medication History Completed by Pharmacy    See [facility] Admission Navigator for allergy information, preferred outpatient pharmacy, prior to admission medications and immunization status.     Medication History Sources:     Patient    Sure Scripts    Changes made to PTA medication list (reason):    Added: None    Deleted:   o Ibuprofen PO (per patient)    Changed: None    Additional Information:    Patient confirms that he is not currently taking any prescriptions, over-the-counter medications, or supplements.    Prior to Admission medications    Not on File       Date completed: 05/12/21    Medication history completed by: Mariam Cho

## 2021-05-13 NOTE — PROGRESS NOTES
"CLINICAL NUTRITION SERVICES - ASSESSMENT NOTE     Nutrition Prescription    RECOMMENDATIONS FOR MDs/PROVIDERS TO ORDER:  -Pt is at risk for refeeding syndrome. Recommend following Phos along with Mg and K+ over the next few days.   - Recommend adjusting multivit and folic acid from IV to oral if not expecting pt to require NPO status.    Malnutrition Status:    Non-severe malnutrition in the context of acute on chronic illness.    Recommendations already ordered by Registered Dietitian (RD):  - Order Zn and vitamin soluble Vitamins--ADEK labs (suspect decreased bile and possible fat and fat soluble vitamin malabsorption).  - Adjust Boost Breeze to Ensure Max protein BID (will switch to Ensure Enlive when chocolate back in stock).  - Start Calorie counts.    Future/Additional Recommendations:  - Follow electrolytes, po intake--adjust Ensure Max to Ensure Enlive (when chocolate back in stock) depending upon whether total calorie or protein intake more of a concern.  - Check results of Zn, Vitamin ADEK labs.       Met with pt this morning.  Somewhat vague historian.  Quite shaky from withdrawal. Noted per care rounds that pt will be moved to tele floor.    REASON FOR ASSESSMENT  Tulio Rosales is a/an 26 year old male assessed by the dietitian for Malnutrition Screening Tool--wt loss and decreased po intake and Provider Order -\"Chronic alcohol use, consulting for protein and calorie targets\"    Chart reviewed.  PMH includes schizophrenia (having auditory and visual hallucinations), alcohol use, psoriasis w/ arthropathy.  Has been drinking since he was 19 years old.  He used to drink 1 L daily, 6 months of sobriety in 2018, over the past year has cut back to 10-12 oz of 80 proof vodka daily.  He is now interested in detox.    NUTRITION HISTORY  Per H&P, decreased appetite over the past 3 months.  He has been vomiting frequently--at least every other day.  He's noticed increasingly pale stools w/ RUQ pain.  He has been " "eating small amounts throughout the day.  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  He was somewhat vague about his intake at home-said not eating much meat but then said he had been eating more lately.  He and his girlfriend have been ordering take out-- \"not healthy.\"     CURRENT NUTRITION ORDERS  Diet: Regular Boost Breeze between meals ordered by MD (Ensure clear will be sent per current formulary)  Intake/Tolerance: not much appetite, ate a few bites of omelet when I was visiting.      LABS  Labs reviewed  Albumin 2.9 c/w liver disease w/ elevated LFTs.  K+,Mg and Phos wnl 5/12.    GI- Per 5/12 Abd US-Hepatomegaly with echogenic hepatic parenchyma, suggestive of intrinsic hepatic parenchymal disease.     MEDICATIONS  Medications reviewed  High dose Thiamine IV over 7 days followed by 100 mg/day.  He received 1 L bolus banana bag including infuvite, folic acid, thiamine and mag sulfate.  Orders for daily folic acid and multivit still in IV form.   Noted Mg and K+ replacement protocol ordered.     ANTHROPOMETRICS  Height: 180.3 cm (5' 11\")  Most Recent Weight: 93.8 kg (206 lb 12.8 oz)   IBW: 78.2 kg (120% IBW)  BMI: Overweight BMI 25-29.9  Weight History: 13# (6%) wt loss in past 3 months   Wt Readings from Last 10 Encounters:   05/12/21 93.8 kg (206 lb 12.8 oz) standing   05/11/21 93 kg (205 lb)   02/12/21 99.8 kg (220 lb) [facility]   09/18/19 98.4 kg (217 lb)   09/17/19 98.4 kg (217 lb)     Dosing Weight: 82 kg (using 5/12 93.8 kg and IBW of 78.2kg)    ASSESSED NUTRITION NEEDS  Estimated Energy Needs: 2460- 2870 kcals/day (30 - 35 kcals/kg)  Justification: higher needs w/ AAH  Estimated Protein Needs:  grams protein/day (1.2 - 1.5 grams of pro/kg)  Justification: Hypercatabolism with acute illness and Repletion  Estimated Fluid Needs: 1670-2898 mL/day (30 - 35 mL/kg)   Justification: Maintenance and Per provider pending fluid status    PHYSICAL FINDINGS  See " malnutrition section below.    MALNUTRITION  % Intake: </=75% for >/= 1 month (severe)  % Weight Loss: Up to 7.5% in 3 months (non-severe)  Subcutaneous Fat Loss: Upper arm:  mild  Muscle Loss: Thoracic region (clavicle, acromium bone, deltoid, trapezius, pectoral):  mild  Fluid Accumulation/Edema: None noted  Malnutrition Diagnosis: Non-severe malnutrition in the context of acute on chronic illness.    NUTRITION DIAGNOSIS  Inadequate oral intake related to decreased appetite, n/v as evidenced by 6% wt loss within past 3 months, mild to moderate fat and muscle loss.    INTERVENTIONS  Implementation  Nutrition Education: explained reason for RD visit, NFPE, discussed protein supplement options.   Medical food supplement therapy  Nutrition-related medication management   See Recommendations.    Goals  Patient to consume % of nutritionally adequate meal trays TID, or the equivalent with supplements/snacks.     Monitoring/Evaluation  Progress toward goals will be monitored and evaluated per protocol.    Jazmine Sandy RD, LD   7B (M-F) Pager: [phone]  RD Weekend Pager: [phone]

## 2021-05-13 NOTE — ED NOTES
[facility]   ED Nurse to Floor Handoff     Tulio Rosales is a 26 year old male who speaks English and lives with others,  in a home  They arrived in the ED by car from home    ED Chief Complaint: Critical Values    ED Dx;   Final diagnoses:   Alcohol dependence with withdrawal with complication (H)   Acute hepatitis   Jaundice         Needed?: No    Allergies:   Allergies   Allergen Reactions     Oxycodone Nausea and Vomiting     Severe emesis with opioid medications   .  Past Medical Hx:   Past Medical History:   Diagnosis Date     Admitted to substance misuse detoxification center      Polyarthritis of upper arm 09/18/2019    L UE      Baseline Mental status: WDL  Current Mental Status changes: at basesline    Infection present or suspected this encounter: no  Sepsis suspected: No  Isolation type: No active isolations  Patient tested for COVID 19 prior to admission: YES     Activity level - Baseline/Home:  Independent  Activity Level - Current:   Independent    Bariatric equipment needed?: No    In the ED these meds were given:   Medications   LORazepam (ATIVAN) tablet 1-2 mg (2 mg Oral Given 5/12/21 2024)   LORazepam (ATIVAN) tablet 1-2 mg (has no administration in time range)   thiamine (B-1) tablet 100 mg (100 mg Oral Given 5/12/21 2118)   folic acid (FOLVITE) tablet 1 mg (1 mg Oral Given 5/12/21 2118)   multivitamin w/minerals (THERA-VIT-M) tablet 1 tablet (1 tablet Oral Given 5/12/21 2118)   LORazepam (ATIVAN) injection 1 mg (1 mg Intravenous Given 5/12/21 1720)   ondansetron (ZOFRAN) injection 4 mg (4 mg Intravenous Given 5/12/21 1720)   dextrose 5% and 0.45% NaCl 1,000 mL with Infuvite Adult 10 mL, thiamine 100 mg, folic acid 1 mg, magnesium sulfate 2 g infusion ( Intravenous Stopped 5/12/21 2022)       Drips running?  No    Home pump  No    Current LDAs  Peripheral IV 05/12/21 Left Lower forearm (Active)   Site Assessment WDL 05/12/21 1608   Line  Status Saline locked 05/12/21 1608   Dressing Intervention New dressing  05/12/21 1608   Number of days: 0       Labs results:   Labs Ordered and Resulted from Time of ED Arrival Up to the Time of Departure from the ED   CBC WITH PLATELETS DIFFERENTIAL - Abnormal; Notable for the following components:       Result Value    RBC Count 3.96 (*)     Hemoglobin 13.2 (*)     Hematocrit 37.0 (*)     MCH 33.3 (*)     RDW 21.2 (*)     Platelet Count 107 (*)     All other components within normal limits   COMPREHENSIVE METABOLIC PANEL - Abnormal; Notable for the following components:    Urea Nitrogen 6 (*)     Creatinine 0.51 (*)     Bilirubin Total 17.0 (*)     Albumin 2.9 (*)     Alkaline Phosphatase 254 (*)      (*)     All other components within normal limits   UA MACROSCOPIC WITH REFLEX TO MICRO AND CULTURE - Abnormal; Notable for the following components:    Bilirubin Urine Large (*)     Blood Urine Moderate (*)     Protein Albumin Urine 100 (*)     Urobilinogen mg/dL 4.0 (*)     RBC Urine 16 (*)     Mucous Urine Present (*)     Hyaline Casts 9 (*)     All other components within normal limits   BILIRUBIN DIRECT - Abnormal; Notable for the following components:    Bilirubin Direct 13.1 (*)     All other components within normal limits   ALCOHOL ETHYL - Abnormal; Notable for the following components:    Ethanol g/dL 0.20 (*)     All other components within normal limits   INR - Abnormal; Notable for the following components:    INR 1.38 (*)     All other components within normal limits   PARTIAL THROMBOPLASTIN TIME - Abnormal; Notable for the following components:    PTT 40 (*)     All other components within normal limits   DRUG ABUSE SCREEN 6 CHEM DEP URINE ([facility])   ACETAMINOPHEN LEVEL   MAGNESIUM   ACETAMINOPHEN   HEPATITIS B SURFACE ANTIBODY   HEPATITIS A ANTIBODY IGM   HEPATITIS B CORE ANTIBODY IGM   HEPATITIS B SURFACE ANTIGEN   HEPATITIS C ANTIBODY   SARS-COV-2 (COVID-19) VIRUS RT-PCR   PERIPHERAL IV  "CATHETER   CIWA-AR SCALE AND VS   ASSESS SUICIDALITY   NOTIFY PROVIDER   NOTIFY PROVIDER   CIWA-AR SCALE AND VS   ASSESS SUICIDALITY   NOTIFY PROVIDER   NOTIFY PROVIDER       Imaging Studies: No results found for this or any previous visit (from the past 24 hour(s)).    Recent vital signs:   /80   Pulse 116   Temp 98.3  F (36.8  C) (Oral)   Resp 18   Ht 1.803 m (5' 11\")   Wt 91.6 kg (202 lb)   SpO2 95%   BMI 28.17 kg/m      [place]berry Coma Scale Score: 15 (05/12/21 2105)       Cardiac Rhythm: Tachycardia  Pt needs tele? No  Skin/wound Issues: None    Code Status: Full Code    Pain control: pt had none    Nausea control: fair    Abnormal labs/tests/findings requiring intervention: See results    Family present during ED course? No   Family Comments/Social Situation comments: n/a    Tasks needing completion: None    Angeli Lund RN  [facility] -- [phone] [facility]      "

## 2021-05-13 NOTE — H&P
[facility]    Family Medicine History and Physical - [facility]       Date of Admission:  5/12/2021    Chief Complaint   Acute hepatitis     History is obtained from the patient and EMR    History of Present Illness   Tulio Rosales is a 26 year old male with significant history of alcohol dependence (daily use of up to 1 L 100 prove vodka since 2018), psychotic disorder, presenting to the ED after significantly elevated bilirubin found at a primary care appointment yesterday.  Patient has been drinking heavily since the age of 19, had a 6-month window of sobriety in 2018, over the past year has been cutting down his daily use and is now at 20 ounces of 80 proof vodka every day; his last drink was at 09 30-day of admission.  Over the last 3 months he has had increasing malaise, decrease in appetite, frequent vomiting at least every other day, upper right abdominal discomfort, increasingly pale stools, intermittent blood in stool sometimes small amounts like a hemorrhoid sometimes larger amounts in the bowl.  He denies ever noticing blood in his vomit.  He notes that when he wakes up first thing in the morning he has these jerky shakes.  He chronically has auditory and visual hallucinations, little girls looking and through windows, frogs everywhere, facial feature distortions as well as auditory hallucinations that can be command type but that he feels no impulse to obey.  He says he can clearly tell when he is hallucinating and does not report any delusional thinking, no desire to harm himself or others at any time he is managing his symptoms via metacognition and reality testing he is not interested in taking antipsychotic medication.  He went to see his primary care doctor yesterday in part triggered by this increasing malaise to establish care.  At that time he was found to have significantly elevated bilirubin, he received a phone call with that result  today and now presents himself to the emergency department.    In the emergency department patient had significant labs as follows bili total 17, alk phos 254, ALT 61, , bili direct 13.1, INR 1.38, platelets 107, hemoglobin 13.2, abdominal ultrasound showing significant parenchymal disease.  He was placed under CIWA and scored 16, started receiving Ativan for management of alcohol withdrawal symptoms.  When I spoke to the patient he noted feeling generally uncomfortable or ill that he is however he is not having worsening hallucinations, no tactile hallucinations, he is mildly nauseous but improved since arrival.  He feels that the Ativan is very helpful to his discomfort.  He is interested in undergoing detox and says he is done with alcohol, his fiancée is sober and supportive of him remaining abstinent.  He is willing to come into the hospital for monitoring of his acute hepatitis presumed alcoholic and medical management of detox.    Review of Systems   The 10 point Review of Systems is negative other than noted in the HPI or here.     Past Medical History    I have reviewed this patient's medical history and updated it with pertinent information if needed.   Past Medical History:   Diagnosis Date     Admitted to substance misuse detoxification center      Polyarthritis of upper arm 09/18/2019    L UE        Past Surgical History   I have reviewed this patient's surgical history and updated it with pertinent information if needed.  Past Surgical History:   Procedure Laterality Date     NO HISTORY OF SURGERY          Social History   Social History     Tobacco Use     Smoking status: Never Smoker     Smokeless tobacco: Never Used   Substance Use Topics     Alcohol use: Yes     Comment: 10-12 ounces a day of 80 proof vodka     Drug use: Never       Family History   I have reviewed this patient's family history and updated it with pertinent information if needed.   Family History   Problem Relation Age of  Onset     Diabetes Mother      Diabetes Brother      Arthritis Brother         Rheumatoid       Prior to Admission Medications   None     Allergies   Allergies   Allergen Reactions     Oxycodone Nausea and Vomiting     Severe emesis with opioid medications       Physical Exam   Vital Signs: Temp: 98.3  F (36.8  C) Temp src: Oral BP: (!) 147/86 Pulse: 111   Resp: 18 SpO2: 93 % O2 Device: None (Room air)    Weight: 202 lbs 0 oz    General Appearance: Alert and appropriate gentleman who appears his stated age sitting upright in bed  Eyes: Significant scleral icterus bilaterally, extraocular movements intact without nystagmus,   HEENT: Neck with full range of motion without restriction, mucous membranes tacky and dark pink, see neuro  Respiratory: Speaking in full sentences on room air, lungs clear to auscultation bilaterally to the bases  Cardiovascular: Tachycardic but regular, radial DP DP pulses symmetric and 2+, no peripheral edema  GI: Abdomen soft, mildly tender in the right upper quadrant, liver edge palpated 1-1/2 fingerbreadths below rib border, no rebound or peritoneal signs, no fluid wave or significant ascites noted, no caput medusa, no stretch marks  Skin: Moderately jaundiced to the hairline  Musculoskeletal: Joints knees ankles wrists elbows shoulders with grossly full range of motion nontender  Neurologic: Alert and oriented x4, jerky tremulousness noted in tongue and extremities mostly hands, no tongue fasciculations no muscle fasciculations in calves, asterixis noted bilaterally, speech is clear and appropriate, cranial nerves grossly intact, denies tactile hallucinations at this time, short and long-term memory intact compared to chart review of reported encounters  Psychiatric: Attends provider appropriately, appears anxious and uncomfortable but not distracted by internal stimuli, denies desire to harm self or others    Assessment & Plan   Tulio Rosales is a 26 year old male admitted on  5/12/2021. He has significant history of alcohol abuse, schizophrenia, presented to the ED after outpatient labs found signs of hepatitis. He is admitted for supportive management of hepatitis and detox support from alcohol.     # Acute hepatitis  # Jaundice  MELD-Na score: 22 at 5/12/2021  4:04 PM  MELD score: 21 at 5/12/2021  4:04 PM  Calculated from:  Serum Creatinine: 0.51 mg/dL (Rounded to 1 mg/dL) at 5/12/2021  4:04 PM  Serum Sodium: 136 mmol/L at 5/12/2021  4:04 PM  Total Bilirubin: 17.0 mg/dL at 5/12/2021  4:04 PM  INR(ratio): 1.38 at 5/12/2021  4:04 PM  Age: 26 years  Patient with acute hepatitis most likely alcoholic hepatitis however with the arthritic psoriasis cannot rule out autoimmune factors, he also has chronic joint pain and while he does not report overusing Tylenol acetaminophen level is pending.  He has had multiple months of developing malaise, atypical bleeding, pale stools and jaundice, jerky tremors that sound more consistent with an asterixis than they do alcohol withdrawal.  AST, alk phos, bili total, direct bili all significantly elevated; ultrasound showing extensive parenchymal damage but no vessel obstruction at this time, does not have visible ascites.  Patient will be admitted for management of acute hepatitis mostly symptoms and consulting hepatology for recommendations.  - CMP, INR, and CBC in the AM  - EKG  - IVF  - Ammonia, phos, acetaminophen pending  - If ammonia elevated will start lactulose  - GI Hepatology consulted, appreciate recs   -They will follow peripherally per ED sign out    #Alcohol abuse, persistent   Patient with alcohol use spanning 7 years, six-month window of sobriety in 2018.  Has attempted to cut back on his own at home going from 32 ounces of 100 proof vodka a day to 20 ounces of 80 proof vodka before he became too symptomatic to drop further.  He is not interested in speaking with addiction medicine as at this time is not interested in treatment but would  like support detoxing safely so he can maintain sobriety on his own.  He reports a good support network of sober people including his fiancée and identifies his work as being supportive.  With his chronic hallucinations it is unclear whether he has ever had DTs but he denies any memory of seizing.  His last drink of alcohol was at 930 morning of admission (12 May).  Anticipate that the tremulousness we are seeing now is actually more related to his liver injury given characteristics of tremulousness and anticipate tremulousness and potential for seizure will increase in severity over the next 3 to 4 days.  -CIWA   -Ativan 1-2mg q30 min  - Zofran, compazine for nausea    # Schizophrenia  Patient with significant history of psychotic symptoms, he does not have reported history of delusional thinking he is clearly able to say what features are hallucinations and what is reality.  He has been coping with his symptoms by using metacognition, reality testing, and likely to some degree his alcohol use.  He is not interested in starting antipsychotic medications, he is not interested in speaking with psychiatry as he feels he is handling the hallucinations well.  He has a regular job in which she is in good standing, maintains personal relationships, maintains housing.  Of note his hallucinations are visual and auditory.  He is clearly able to tell when they are hallucinations.  If he begins having tactile or olfactory hallucinations, this would be atypical for him, if he is having command type hallucinations that he feels compelled to obey this would be atypical for him, if he is responding to hallucinations or internal stimuli as though they are present in reality this would be atypical for him.  All of these may be signs that his alcohol withdrawal symptoms are worsening.    # Polyarthritis   # Psoriasis  Chronic patches of dry, scaled skin to shins bilaterally. Healing patches to hands, bilaterally. Patient reports  relief from Eucerin and some steroid cream however feels he will not need the steroid cream for now.   - Eucerin PRN    # Pain Assessment:  Current Pain Score 5/12/2021   Patient currently in pain? no   - Tulio is experiencing pain due to chronic arthritis in multiple joints. Pain management was discussed and the plan was created in a collaborative fashion.  Tulio's response to the current recommendations: engaged  - Please see the plan for pain management as documented above      Diet: No diet orders on file  Fluids: 125cc/hr LR  DVT Prophylaxis: Low Risk/Ambulatory with no VTE prophylaxis indicated PADUA 3  Code Status: Full Code    Disposition Plan   Expected discharge: 2 - 3 days; recommended to TBD once mental status at baseline and safe disposition plan/ TCU bed available.Dispo: Expected Discharge Date: 05/15/21       Entered: Dariela Gonzales 05/12/2021, 11:15 PM   Information in the above section will display in the discharge planner report.    The patient was discussed with Dr. Luly Gonzales  [facility] PGY-1  [facility]   Pager: [phone]  Please see sticky note for cross cover information      Data   Recent Labs   Lab 05/12/21  1604 05/11/21  1157   WBC 6.7 6.0   HGB 13.2* 13.9   MCV 93 94   * 102*   INR 1.38* 1.27*     --    POTASSIUM 3.8  --    CHLORIDE 105  --    CO2 24  --    BUN 6*  --    CR 0.51*  --    ANIONGAP 8  --    KAYLEN 8.6  --    GLC 98  --    ALBUMIN 2.9* 3.0*   PROTTOTAL 7.7 7.9   BILITOTAL 17.0* 16.3*   ALKPHOS 254* 197*   ALT 61 68   * 255*     Recent Results (from the past 24 hour(s))   US Abdomen Limited w Doppler Complete    Narrative    EXAMINATION: US ABDOMEN LIMITED WITH DOPPLER COMPLETE 5/12/2021 9:22  PM     COMPARISON: None.    HISTORY: Acute liver failure, eval with Doppler    TECHNIQUE: The right upper quadrant was scanned in standard fashion  with specialized ultrasound transducer(s) using  both gray-scale, color  Doppler, and spectral flow techniques.    Findings:  Liver: The liver is enlarged, measuring 18.6 cm, with increased  echogenicity of the hepatic parenchyma. No evidence of a focal hepatic  mass. The main portal vein measures 1.3 cm in diameter.    Extrahepatic portal vein flow is antegrade at 20 cm/s.  Right portal vein flow is antegrade, measuring 10 cm/s.  Left portal vein flow is antegrade, measuring 10 cm/s.    Flow in the hepatic artery is towards the liver and:  123 cm/s peak systolic velocity  0.65 resistive index.     The splenic vein is nonvisualized.  The left and right hepatic veins  are not visualized. The middle middle hepatic vein is patent with flow  towards the IVC. The IVC is patent with flow towards the heart. The  visualized aorta is not dilated.    Gallbladder: Small amount of fluid around the gallbladder, nonspecific  in the setting of intrinsic hepatic parenchymal disease. No  gallbladder wall thickening, cholelithiasis, or positive sonographic  Mckee's sign.    Bile Ducts: Both the intra- and extrahepatic biliary system are of  normal caliber.  The common bile duct is not visualized.    Pancreas: Pancreas is not visualized due to overlying bowel gas    Kidneys: The right kidney measures 13.8 cm and demonstrates normal  echotexture, without mass or hydronephrosis.      Fluid: No evidence of ascites or pleural effusions.      Impression    Impression:   1.  Hepatomegaly with echogenic hepatic parenchyma, suggestive of  intrinsic hepatic parenchymal disease.   2.  Patent Doppler evaluation of the hepatic vasculature, with the  exception of the left and right hepatic veins which are not  visualized.  3.  Small amount of fluid around the gallbladder, nonspecific in the  setting of intrinsic hepatic parenchymal disease.    I have personally reviewed the examination and initial interpretation  and I agree with the findings.    KINA HENRIQUEZ MD

## 2021-05-13 NOTE — CONSULTS
Hepatology Consultation    Tulio Rosalse   MRN# [id number]     Age: 26 year old YOB: 1995       Referring provider: Spike Sorensen  Attending Hepatologist: Dr. Coburn   Consult requested for: alcohol hepatitis       Assessment and Recommendation:   Assessment:   Mr. Rosales is a 26-year-old with a history of alcohol use disorder, last use 5/12/21, complicated by history of withdrawal seizure, schizophrenia (not on medications), psoriasis, hypertension who was admitted with elevated liver tests, active alcohol use and noted to have concern for advanced liver disease.      Recommendations:   1.  Alcohol hepatitis  -Ultrasound with evidence of increased echogenicity and hepatomegaly, common with alcohol hepatitis.  MDF 34.  With his past history of untreated schizophrenia, would hold on starting high-dose steroids, especially with borderline MDF.  -Currently has tremors, likely from withdrawal.  -Discussed need for complete abstinence from alcohol use.  Addiction medicine consult beneficial for him in order to connect with a provider as well as consideration for medications for alcohol cravings in the future.  -Viral serologies negative  -Agree with dietitian recommendations.  He has been taking Ensure.  -No current evidence of ascites on ultrasound.    2.  BRBPR  -Has had blood on tissue while wiping, likely related to hemorrhoidal bleeding.  -Continue to trend hemoglobin, no plans for colonoscopy at this time.    3. Schizophrenia  -He has not connected with a mental health provider.  Encouraged connection with mental health provider if amenable.     4. Immunizations  -Consider starting hepatitis A, hepatitis B vaccination prior to discharge    Plan of care discussed with Dr. Coburn    Thank you for the opportunity to be involved in Tulio Rosales care. Please call with any questions or concerns.     Will hold off.     RUSTY Barth, CNP  Inpatient Hepatology [facility]  Text link               History  of Present Illness:   Tulio Rosales is a 26 year old male with a history of alcohol use disorder, last use 5/12/21. His past medical history also includes history of withdrawal seizure, mental health disorder with past diagnosis of schizophrenia and hallucinations, psoriasis, HTN, polyarthritis who was admitted with elevated liver tests and recommendations for detox from alcohol. He notes over the past 3 months having increasing malaise, RUQ pain, decreased appetite, daily vomiting and intermittent BRBPR.   He underwent an abdominal US with hepatomegaly, no ascites, patent vasculature, and concern for intrinsic liver disease.    Mr. Rosales states he has been told he has had an injury to his liver 4 years ago related to alcohol use.  He states he has been attempting to decrease his oral intake of alcohol, previously drinking up to 1 L of vodka daily.  He reports taking 10 to 20 ounces of vodka daily at this time.  He has never gone through a treatment program and reports never attending AA in the past.  His family has concerns about his alcohol use but has not had any difficulty with work.  He states he does have some cravings for alcohol intermittently.    Mr. Rosales notes that his appetite is slightly reduced and he vomits in the morning when he has had a full stomach overnight.  He denies any recent fevers, coughs, colds, bloating, lower extremity edema.  He denies any abnormal bruising.  He does have intermittent bright red blood per rectum mainly seen with wiping after a stool, most recently 5/12.  He denies any knowledge of hemorrhoids or hematemesis.    He was diagnosed with schizophrenia at age 17 but does report he has not seen a mental health provider or taking medications for his mental health.  Per notes, he has reported hallucinations and hearing voices.             Past Medical History:     Past Medical History:   Diagnosis Date     Admitted to substance misuse detoxification center       "Polyarthritis of upper arm 09/18/2019    L UE              Past Surgical History:     Past Surgical History:   Procedure Laterality Date     NO HISTORY OF SURGERY                Social History:     Social History     Tobacco Use     Smoking status: Never Smoker     Smokeless tobacco: Never Used   Substance Use Topics     Alcohol use: Yes     Comment: 10-12 ounces a day of 80 proof vodka    Intranasal drug use at age 15. No IVDU  No hx of alcohol treatment or AA participation  Works as a          Family History:   The family history includes Arthritis in his brother; Diabetes in his brother and mother.             Immunizations:     tdap 2006         Allergies:     Allergies   Allergen Reactions     Oxycodone Nausea and Vomiting     Severe emesis with opioid medications             Medications:   @  No medications prior to admission.   @          Review of Systems:    ROS: 10 point ROS neg other than the symptoms noted above in the HPI.          Physical Exam:   Blood pressure (!) 152/92, pulse 107, temperature 98  F (36.7  C), temperature source Oral, resp. rate 18, height 1.803 m (5' 11\"), weight 93.8 kg (206 lb 12.8 oz), SpO2 97 %. Body mass index is 28.84 kg/m .    General: In no acute distress, no facial muscle wasting  Neuro: AOx3, No asterixis. tremors  HEENT: PERRL mild scleral icterus, Nooral lesions  Lymph:  Nocervical lymphadenoapthy  CV: S1/S2 with gr 2/6 murmurs, Skin warm and dry  Lungs: clear to auscultation Respirations even and nonlabored on room air  Abd: nondistended, nontender  Extrem: Noperipehral edema  Skin: trace jaundice  Psych: flat         Data:     Lab Results   Component Value Date    WBC 6.7 05/12/2021     Lab Results   Component Value Date    RBC 3.96 05/12/2021     Lab Results   Component Value Date    HGB 13.2 05/12/2021     Lab Results   Component Value Date    HCT 37.0 05/12/2021     No components found for: MCT  Lab Results   Component Value Date    MCV 93 05/12/2021     Lab " Results   Component Value Date    MCH 33.3 05/12/2021     Lab Results   Component Value Date    MCHC 35.7 05/12/2021     Lab Results   Component Value Date    RDW 21.2 05/12/2021     Lab Results   Component Value Date     05/12/2021       Last Basic Metabolic Panel:  Lab Results   Component Value Date     05/12/2021      Lab Results   Component Value Date    POTASSIUM 3.8 05/12/2021     Lab Results   Component Value Date    CHLORIDE 105 05/12/2021     Lab Results   Component Value Date    KAYLEN 8.6 05/12/2021     Lab Results   Component Value Date    CO2 24 05/12/2021     Lab Results   Component Value Date    BUN 6 05/12/2021     Lab Results   Component Value Date    CR 0.51 05/12/2021     Lab Results   Component Value Date    GLC 98 05/12/2021       Liver Function Studies -   Recent Labs   Lab Test 05/12/21  1604   PROTTOTAL 7.7   ALBUMIN 2.9*   BILITOTAL 17.0*   ALKPHOS 254*   *   ALT 61       Lab Results   Component Value Date    INR 1.38 05/12/2021       MELD-Na score: 22 at 5/13/2021  7:58 AM  MELD score: 21 at 5/13/2021  7:58 AM  Calculated from:  Serum Creatinine: 0.43 mg/dL (Rounded to 1 mg/dL) at 5/13/2021  7:58 AM  Serum Sodium: 135 mmol/L at 5/13/2021  7:58 AM  Total Bilirubin: 18.6 mg/dL at 5/13/2021  7:58 AM  INR(ratio): 1.37 at 5/13/2021  7:58 AM  Age: 26 years           Previous Endoscopy:     No history    IMAGING:  US abd w dopp 5/12/21  Impression:   1.  Hepatomegaly with echogenic hepatic parenchyma, suggestive of  intrinsic hepatic parenchymal disease.   2.  Patent Doppler evaluation of the hepatic vasculature, with the  exception of the left and right hepatic veins which are not  visualized.  3.  Small amount of fluid around the gallbladder, nonspecific in the  setting of intrinsic hepatic parenchymal disease.

## 2021-05-13 NOTE — PLAN OF CARE
"BP (!) 152/92 (BP Location: Right arm)   Pulse 107   Temp 98  F (36.7  C) (Oral)   Resp 18   Ht 1.803 m (5' 11\")   Wt 93.8 kg (206 lb 12.8 oz)   SpO2 97%   BMI 28.84 kg/m      Pt arrived to unit from ED ~2345   Status: Alcohol dependence with withdrawal with complication, Acute hepatitis ,Jaundice  Neuros: A&O x4. CIWAs performed x4. Pt scored 10, 9,8, and 7 and w/ 1 mg oral Ativan given. CMS intact   Cardiac: Hypertensive w/in parameters. Tachycardic in 100s-110s, EKG performed showing Sinus Tach. Denies cardiac chest pain   Respiratory: Pt denies shortness of breath. LS diminished. Satting >95% on RA   GI/: Voiding adequately icteric colored urine. +Bs, +Flatus. Pt reported having x1 maroon colored stool earlier in the day 5/12   Diet/Nausea Regular diet, intermittent nausea  Skin: Admitted/transferred from:   2 RN full   skin assessment completed by Pritesh Davies RN and Ricarda LOZADA RN.  Skin assessment finding: issues found Psoriasis noted on BL hands + BL LEs, skin jaundiced    Interventions/actions: other Educated on importance of frequent repositioning      Will continue to monitor   Lines: L PIV infusing LR @ 125 mL/hr   Labs: Bilirubin: 17.0, Reviewed   Pain: Reports some abdominal discomfort manageable w/out pain medications   Activity: Up in room w/ assist x1  + walker, unsteady d/t tremors  Plan: Continue CIWA, GI consult, Continue POC     "

## 2021-05-13 NOTE — PROGRESS NOTES
Reason for transfer: ETOH withdrawal; needs tele  Transferred from: 7B  Report received from: 7B RN  2 RN skin assessment completed by: Beatrice ROSARIO RN & Sal CHIN RN  Bed algorithm reevaluated: yes  Was Pulsate ordered?: no  Belongings: Remain with pt. See previous summary of care note. Declined any belongings to be sent to security.

## 2021-05-13 NOTE — PLAN OF CARE
VSS ex tachy and temp of 100.5. EKG done overnight. Tele ordered. Transfer to  at 1315.   GI/: voided, urine sent down. Per pt BM 5/12  Diet: regular diet, had nausea intermitt and emesis x2  IV: L PIV LR at 125  Activity: up A1, very shaky/tremors  PRN meds: CIWAs of 11-14. Ativan given x2; compazine given   Triggered sepsis, lab drawn not yet resulted.

## 2021-05-13 NOTE — PLAN OF CARE
Assumed cares [id number]. A&O and able to make needs known. Tachycardic HR; on tele. HTN; other VSS on RA. On CIWA protocol scoring 9-15 currently needing Ativan q1h. Reporting mild headache and anxiety. Moderate-severe tremors. Mild-moderate nausea. Denies visual and auditory disturbances. Pt very unsteady on feet. Assist of 1-2 with transfers. On bed alarm for safety. PIV infusing LR @ 125. Regular diet. Duncan counts to start 5/14. No BM this shift. Plan for possible CT when pt's tremors improve. Continue with plan of care.

## 2021-05-13 NOTE — PLAN OF CARE
Patient has been getting oral ativan every hour for the last few hours. Pt decided he wanted to leave, MD paged and came and saw pt. AMA form was completed with MD and signed by patient. MD said pt wasn't hold able.Patient very unsteady and was brought down in a wheelchair. Per ANS pt was brought across the street.

## 2021-05-14 ENCOUNTER — TELEPHONE (OUTPATIENT)
Dept: FAMILY MEDICINE | Facility: CLINIC | Age: 26
End: 2021-05-14

## 2021-05-14 LAB
APAP SERPL-MCNC: <5 UG/ML (ref 10–30)
CERULOPLASMIN SERPL-MCNC: 27 MG/DL (ref 20–60)
COPATH REPORT: NORMAL

## 2021-05-14 NOTE — TELEPHONE ENCOUNTER
Patient and mom calling asking questions about liver panel that was run a few days ago.  They were recommended to go to the ER due to elevated bilirubin.  Patient went to [facility] on 5/12/21 and left AMA on 5/13/21.    Scheduled f/u with PCP for Tuesday 5/18/21, will call if this needs to be changed.    Patient did not receive any medications when he left hospital, when asked how he was doing at home he said ok.      He currently is experiencing coordination problems, unable to walk with out assistance or holding onto something,  Positive for tremors as well as visual hallucinations- seeing people in his house.  Denies bowel or bladder problems, no N/V.    I explained that his condition is serious and that he needs medical attention in order to safely withdraw from alcohol as well as to monitor his liver values.  They are agreeable to go to [place] ED as he felt more comfortable at this hospital when he went through withdrawals previously.      Sarah Gupta RN

## 2021-05-14 NOTE — PROGRESS NOTES
[facility]: Post-Discharge Note  SITUATION                                                      Admission:    Admission Date: 05/12/21   Reason for Admission: Alcohol dependence with withdrawal with complication (H)  Discharge:   Discharge Date: 05/13/21  Discharge Diagnosis: Alcohol dependence with withdrawal with complication (H)  Discharge Service: Family Medicine    BACKGROUND                                                      Tulio Rosales is a 26 year old male admitted on 5/12/2021. He has significant history of alcohol abuse, schizophrenia, and presented to the ED after outpatient labs found signs of hepatitis. He was admitted for supportive management of hepatitis and detox support from alcohol and ultimately left AMA after being uncomfortable in the hospital.     ASSESSMENT      Discharge Assessment  Patient reports symptoms are: Unchanged  Does the patient have all of their medications?: Yes  Does patient know what their new medications are for?: Not applicable  Does patient have a follow-up appointment scheduled?: No  Does patient have any other questions or concerns?: No    Post-op  Did the patient have surgery or a procedure: No  Fever: No  Chills: No  Eating & Drinking: eating and drinking without complaints/concerns  PO Intake: regular diet  Bowel Function: normal  Urinary Status: voiding without complaint/concerns    PLAN                                                      Outpatient Plan:      Follow-up with PCP in 1-2 days after leaving AMA    No future appointments.        Yolette Orozco, EVELINE

## 2021-05-18 ENCOUNTER — OFFICE VISIT (OUTPATIENT)
Dept: FAMILY MEDICINE | Facility: CLINIC | Age: 26
End: 2021-05-18
Payer: COMMERCIAL

## 2021-05-18 VITALS
SYSTOLIC BLOOD PRESSURE: 150 MMHG | BODY MASS INDEX: 28.06 KG/M2 | WEIGHT: 201.2 LBS | HEART RATE: 94 BPM | TEMPERATURE: 98.9 F | DIASTOLIC BLOOD PRESSURE: 80 MMHG | OXYGEN SATURATION: 98 %

## 2021-05-18 DIAGNOSIS — R17 JAUNDICE: ICD-10-CM

## 2021-05-18 DIAGNOSIS — F10.239 ALCOHOL DEPENDENCE WITH WITHDRAWAL WITH COMPLICATION (H): Primary | ICD-10-CM

## 2021-05-18 DIAGNOSIS — F20.9 SCHIZOPHRENIA, UNSPECIFIED TYPE (H): ICD-10-CM

## 2021-05-18 PROBLEM — B17.9 ACUTE HEPATITIS: Status: RESOLVED | Noted: 2021-05-12 | Resolved: 2021-05-18

## 2021-05-18 PROCEDURE — 99495 TRANSJ CARE MGMT MOD F2F 14D: CPT | Performed by: PHYSICIAN ASSISTANT

## 2021-05-18 ASSESSMENT — PAIN SCALES - GENERAL: PAINLEVEL: MILD PAIN (2)

## 2021-05-18 NOTE — PROGRESS NOTES
Assessment & Plan     Alcohol dependence with withdrawal with complication (H)  Still drinking - states 4 drinks last night, but I expect it was a lot more. He needs to detox - he agrees and will go to [facility], refuses to go to [facility] today. He is leaving here now to go with his mom. Called ahead to Dr. Comer in the ED at [facility].     Schizophrenia, unspecified type (H)  Needs treatment - he's refusing meds however. Will hopefully have an evaluation inpatient at [facility] when he presents.     Alcoholism /alcohol abuse (H)  Chronic use. Will need to detox and needs new treatment.    Jaundice  Due to hepatitis. The work up at [facility] did not show an infectious or deposition cause for his condition     No follow-ups on file.    ZOË KNAPP [facility] VIET Antunez is a 26 year old who presents for the following health issues     HPI       Hospital Follow-up Visit:    Hospital/Nursing Home/IP Rehab Facility: [facility]  Date of Admission: 5/12/2021  Date of Discharge: 5/13/2021  Reason(s) for Admission: Alcohol dependence with withdrawal with complication (H)  Acute hepatitis  Jaundice      Was your hospitalization related to COVID-19? No   Problems taking medications regularly:  None  Medication changes since discharge: None  Problems adhering to non-medication therapy:  None    Summary of hospitalization:  [facility] discharge summary reviewed  Diagnostic Tests/Treatments reviewed.  Follow up needed: none  Other Healthcare Providers Involved in Patient s Care:         None  Update since discharge: improved.    Post Discharge Medication Reconciliation: discharge medications reconciled, continue medications without change.  Plan of care communicated with patient        Patient Active Problem List   Diagnosis     Alcohol withdrawal syndrome without complication (H)     Schizophrenia, unspecified type (H)     History of behavioral and mental health  problems     Psoriasis with arthropathy (H)     Alcoholism /alcohol abuse (H)     Jaundice     Alcohol dependence with withdrawal with complication (H)      No current outpatient medications on file.     No current facility-administered medications for this visit.         Review of Systems   Constitutional, HEENT, cardiovascular, pulmonary, GI, , musculoskeletal, neuro, skin, endocrine and psych systems are negative, except as otherwise noted.      Objective    BP (!) 150/80 (BP Location: Right arm, Patient Position: Sitting, Cuff Size: Adult Large)   Pulse 94   Temp 98.9  F (37.2  C) (Oral)   Wt 91.3 kg (201 lb 3.2 oz)   SpO2 98%   BMI 28.06 kg/m    Body mass index is 28.06 kg/m .  Physical Exam   GENERAL: healthy, alert and no distress  SKIN: Jaundice  EYES: Icteric  Neuro: resting tremor  Psych: Appropriate appearance.  Alert and oriented times 3; coherent speech, normal   rate and volume, able to articulate logical thoughts, able   to abstract reason, no tangential thoughts, no hallucinations   or delusions.  Normal behavior.  His affect is bright.

## 2021-05-19 LAB
BACTERIA SPEC CULT: NO GROWTH
SPECIMEN SOURCE: NORMAL

## 2021-05-21 ENCOUNTER — PATIENT OUTREACH (OUTPATIENT)
Dept: CARE COORDINATION | Facility: CLINIC | Age: 26
End: 2021-05-21

## 2021-05-21 DIAGNOSIS — K70.30 ALCOHOLIC CIRRHOSIS OF LIVER (H): Primary | ICD-10-CM

## 2021-05-21 NOTE — PROGRESS NOTES
Clinic Care Coordination Contact  [facility]/Voicemail       Clinical Data: CHW Outreach  Outreach attempted x 1. Left message on patient's voicemail with call back information and requested return call.    Plan: CHW will try to reach patient again in 1-2 business days.    Latasha BEYER Community Health Worker  Clinic Care Coordination  [facility] Clinics : [place], [place] & [place]  Phone: [phone]    Reason for Referral: Care Transition: ED to outpatient  Additional pertinent details: Alcoholic ascites.    Notes:    - Seen in the ER on 05/18/21  - Any questions or resources needed?  - Schedule with CC SW

## 2021-05-24 ENCOUNTER — PATIENT OUTREACH (OUTPATIENT)
Dept: NURSING | Facility: CLINIC | Age: 26
End: 2021-05-24
Payer: COMMERCIAL

## 2021-05-24 NOTE — PROGRESS NOTES
Clinic Care Coordination Contact  Community Health Worker Initial Outreach       CHW Additional Questions  If ED/Hospital discharge, follow-up appointment scheduled as recommended?: N/A  Medication changes made following ED/Hospital discharge?: N/A  MyChart active?: No    Patient accepts CC: No, Patient declined CCC. He said he is doing better.. Patient will be sent Care Coordination introduction letter for future reference.     Latasha BEYER Community Health Worker  Clinic Care Coordination  [facility] Clinics : [place], [place] & [place]  Phone: [phone]    Reason for Referral: Care Transition: ED to outpatient  Additional pertinent details: Alcoholic ascites.

## 2021-05-24 NOTE — LETTER
[facility] CARE COORDINATION  [address] [address]    May 24, 2021    Tulio Rosales  [address] [facility] [address]      Dear Tulio,    I am a clinic community health worker who works with LELAND SANDY PA-C at [facility]. I wanted to thank you for spending the time to talk with me.  Below is a description of clinic care coordination and how I can further assist you.      The clinic care coordination team is made up of a registered nurse,  and community health worker who understand the health care system. The goal of clinic care coordination is to help you manage your health and improve access to the health care system in the most efficient manner. The team can assist you in meeting your health care goals by providing education, coordinating services, strengthening the communication among your providers and supporting you with any resource needs.    Please feel free to contact me at [phone] with any questions or concerns. We are focused on providing you with the highest-quality healthcare experience possible and that all starts with you.     Sincerely,     Latasha BEYER, Community Health Worker  Clinic Care Coordination  [facility] : [place], [place] & [place]  Phone: [phone]

## 2021-05-25 LAB
COLLECT DURATION TIME UR: ABNORMAL HR
COPPER 24H UR-MRATE: ABNORMAL UG/D (ref 3–45)
COPPER ?TM UR-MCNC: 1.9 UG/DL
COPPER/CREAT 24H UR: 61.3 UG/G CRT (ref 10–45)
CREAT 24H UR-MRATE: ABNORMAL MG/D (ref 1000–2500)
CREAT UR-MCNC: 31 MG/DL
SPECIMEN VOL ?TM UR: ABNORMAL ML

## 2021-05-27 ENCOUNTER — OFFICE VISIT (OUTPATIENT)
Dept: FAMILY MEDICINE | Facility: CLINIC | Age: 26
End: 2021-05-27
Payer: COMMERCIAL

## 2021-05-27 VITALS
WEIGHT: 202.4 LBS | BODY MASS INDEX: 28.34 KG/M2 | DIASTOLIC BLOOD PRESSURE: 60 MMHG | HEART RATE: 104 BPM | TEMPERATURE: 98 F | SYSTOLIC BLOOD PRESSURE: 136 MMHG | HEIGHT: 71 IN

## 2021-05-27 DIAGNOSIS — F20.9 SCHIZOPHRENIA, UNSPECIFIED TYPE (H): ICD-10-CM

## 2021-05-27 DIAGNOSIS — F10.239 ALCOHOL DEPENDENCE WITH WITHDRAWAL WITH COMPLICATION (H): ICD-10-CM

## 2021-05-27 PROCEDURE — 99495 TRANSJ CARE MGMT MOD F2F 14D: CPT | Performed by: PHYSICIAN ASSISTANT

## 2021-05-27 RX ORDER — MULTIVITAMIN,THER AND MINERALS
1 TABLET ORAL
COMMUNITY
Start: 2021-05-21

## 2021-05-27 RX ORDER — LANOLIN ALCOHOL/MO/W.PET/CERES
100 CREAM (GRAM) TOPICAL
COMMUNITY
Start: 2021-05-21

## 2021-05-27 RX ORDER — TETRAHYDROZOLINE HCL 0.05 %
1 DROPS OPHTHALMIC (EYE) DAILY PRN
COMMUNITY

## 2021-05-27 RX ORDER — FOLIC ACID 1 MG/1
1 TABLET ORAL
COMMUNITY
Start: 2021-05-21

## 2021-05-27 ASSESSMENT — MIFFLIN-ST. JEOR: SCORE: 1915.21

## 2021-05-27 ASSESSMENT — PAIN SCALES - GENERAL: PAINLEVEL: NO PAIN (0)

## 2021-05-27 NOTE — LETTER
[facility]  [address] [address]  [phone]          May 27, 2021    RE:  Tulio Bobby                                                                                                                                                       [address] [place] [address]            To whom it may concern:    Tulio can return to work on Saturday, May 29th without restrictions.     Sincerely,        Donald Vallejo

## 2021-05-27 NOTE — PROGRESS NOTES
Assessment & Plan     Alcoholism /alcohol abuse (H)  Sober x 1 week. He says he has no cravings and states he's confident he won't drink. He refuses any management. Will consider AA. Declines any treatment or therapy. Wants to do this with his girlfriend. Reviewed / will let me know if he needs any help but declines anything for now.     Alcohol dependence with withdrawal with complication (H)  As above. He has no signs or symptoms of withdrawal. He's a full week past last drink and hospitalization. Consider ongoing self cares. He declines any assistance at this time.      Schizophrenia, unspecified type (H)  As noted. Hears and sees things. No major paranoia is reported. He refuses and declines any meds / referrals, etc, unfortunately. Recommend close monitoring. He is able to reality check, notes that he hears voices that command him but he has no plan or interest in every listening to them. He's done this for 10 years he says and can handle things. Hopefully this remains true. Warning symptoms of worsening condition discussed and patient shows good understanding.     Return in about 3 months (around 8/27/2021) for Physical Exam.    LELAND SANDY PA-C  [facility] [place]    Delores Antunez is a 26 year old who presents for the following health issues     HPI       Hospital Follow-up Visit:    Hospital/Nursing Home/IP Rehab Facility: [facility]  Date of Admission: 05/18/2021  Date of Discharge: 05/20/2021  Reason(s) for Admission: Alcoholic cirrhosis, alcohol withdrawal, alcohol abuse      Was your hospitalization related to COVID-19? No   Problems taking medications regularly:  None  Medication changes since discharge: folic acid 1 mg, multivitamin with minerals, prednisone 20 mg, vitamin B-1 100 mg  Problems adhering to non-medication therapy:  None    Summary of hospitalization:  [facility] / [place] reviewed:   HPI: Tulio Rosales is a 26 y.o. male with hx of  alcohol dependence, alcohol withdrawal seizure, schizophrenia, psoriasis, HTN, polyarthritis who presented to ED for detox. He noted jaundice about a week ago and was briefly admitted at [facility] at which time he left AMA. Last drink evening prior to arrival.    He has noted nausea, tremulous, upper abdominal discomfort, decreased appetite, yellowing of the skin.      Alcohol intoxication/withdrawal was treated with minds protocol with Valium, thiamine, folate, MVI. He declined chemical dependency consultation; he would like to follow-up on his own on outpatient basis. MDF score was 34 and was started on prednisone (however this was reluctantly due to concerns for noncompliance to follow-up). Prednisone would only be given for short course of 7 days, then discontinue. No intention to follow Ella score 7 days from now. Chest x-ray, UA were negative for infections while he was on prednisone. Last Valium use was yesterday around 1500.    Diagnostic Tests/Treatments reviewed.  Follow up needed: none  Other Healthcare Providers Involved in Patient s Care:         None  Update since discharge: improved.    Post Discharge Medication Reconciliation: discharge medications reconciled, continue medications without change.  Plan of care communicated with patient        Patient Active Problem List   Diagnosis     Alcohol withdrawal syndrome without complication (H)     Schizophrenia, unspecified type (H)     History of behavioral and mental health problems     Psoriasis with arthropathy (H)     Alcoholism /alcohol abuse (H)     Jaundice     Alcohol dependence with withdrawal with complication (H)      Current Outpatient Medications   Medication     folic acid (FOLVITE) 1 MG tablet     Multiple Vitamins-Minerals (SUPER THERA VINEET M) TABS     thiamine (B-1) 100 MG tablet     tetrahydrozoline (VISINE) 0.05 % ophthalmic solution     No current facility-administered medications for this visit.         Review of Systems   Constitutional,  "HEENT, cardiovascular, pulmonary, GI, , musculoskeletal, neuro, skin, endocrine and psych systems are negative, except as otherwise noted.      Objective    /60 (BP Location: Right arm, Patient Position: Chair, Cuff Size: Adult Regular)   Pulse 104   Temp 98  F (36.7  C) (Oral)   Ht 1.803 m (5' 11\")   Wt 91.8 kg (202 lb 6.4 oz)   BMI 28.23 kg/m    Body mass index is 28.23 kg/m .  Physical Exam   GENERAL: healthy, alert and no distress  EYES: Icterus noted  HENT: ear canals and TM's normal, nose and mouth without ulcers or lesions  NECK: no adenopathy, no asymmetry, masses, or scars and thyroid normal to palpation  CV: regular rate and rhythm, normal S1 S2, no S3 or S4, no murmur, click or rub, no peripheral edema and peripheral pulses strong  ABDOMEN: soft, nontender, no hepatosplenomegaly, no masses and bowel sounds normal  SKIN: no suspicious lesions or rashes  NEURO: CN II-XII intact. No tremor or weakness.   Psych: Appropriate appearance.  Alert and oriented times 3; coherent speech, normal   rate and volume, able to articulate logical thoughts, able   to abstract reason, no tangential thoughts, no hallucinations   or delusions.  Normal behavior.  His affect is bright.            "

## 2022-01-26 ENCOUNTER — APPOINTMENT (OUTPATIENT)
Dept: CT IMAGING | Facility: CLINIC | Age: 27
DRG: 368 | End: 2022-01-26
Attending: EMERGENCY MEDICINE

## 2022-01-26 ENCOUNTER — HOSPITAL ENCOUNTER (INPATIENT)
Facility: CLINIC | Age: 27
LOS: 3 days | Discharge: LEFT BEFORE DISCHARGE | DRG: 368 | End: 2022-01-29
Attending: EMERGENCY MEDICINE | Admitting: INTERNAL MEDICINE

## 2022-01-26 DIAGNOSIS — U07.1 ASYMPTOMATIC COVID-19 VIRUS INFECTION: ICD-10-CM

## 2022-01-26 DIAGNOSIS — K92.0 HEMATEMESIS WITH NAUSEA: ICD-10-CM

## 2022-01-26 DIAGNOSIS — F10.239 ALCOHOL DEPENDENCE WITH WITHDRAWAL WITH COMPLICATION (H): Primary | ICD-10-CM

## 2022-01-26 LAB
ABO/RH(D): NORMAL
ALBUMIN SERPL-MCNC: 3.2 G/DL (ref 3.4–5)
ALP SERPL-CCNC: 151 U/L (ref 40–150)
ALT SERPL W P-5'-P-CCNC: 93 U/L (ref 0–70)
AMMONIA PLAS-SCNC: 42 UMOL/L (ref 10–50)
ANION GAP SERPL CALCULATED.3IONS-SCNC: 11 MMOL/L (ref 3–14)
ANTIBODY SCREEN: NEGATIVE
APTT PPP: 39 SECONDS (ref 22–38)
AST SERPL W P-5'-P-CCNC: 222 U/L (ref 0–45)
BASOPHILS # BLD AUTO: 0.1 10E3/UL (ref 0–0.2)
BASOPHILS NFR BLD AUTO: 2 %
BILIRUB SERPL-MCNC: 8.5 MG/DL (ref 0.2–1.3)
BUN SERPL-MCNC: 11 MG/DL (ref 7–30)
CALCIUM SERPL-MCNC: 8.9 MG/DL (ref 8.5–10.1)
CHLORIDE BLD-SCNC: 106 MMOL/L (ref 94–109)
CO2 SERPL-SCNC: 23 MMOL/L (ref 20–32)
CREAT SERPL-MCNC: 0.53 MG/DL (ref 0.66–1.25)
CRP SERPL-MCNC: 4.4 MG/L (ref 0–8)
EOSINOPHIL # BLD AUTO: 0 10E3/UL (ref 0–0.7)
EOSINOPHIL NFR BLD AUTO: 1 %
ERYTHROCYTE [DISTWIDTH] IN BLOOD BY AUTOMATED COUNT: 22.1 % (ref 10–15)
ETHANOL SERPL-MCNC: 0.2 G/DL
GFR SERPL CREATININE-BSD FRML MDRD: >90 ML/MIN/1.73M2
GLUCOSE BLD-MCNC: 147 MG/DL (ref 70–99)
HCT VFR BLD AUTO: 37.1 % (ref 40–53)
HGB BLD-MCNC: 12 G/DL (ref 13.3–17.7)
HOLD SPECIMEN: NORMAL
IMM GRANULOCYTES # BLD: 0 10E3/UL
IMM GRANULOCYTES NFR BLD: 0 %
INR PPP: 1.53 (ref 0.85–1.15)
LIPASE SERPL-CCNC: 92 U/L (ref 73–393)
LYMPHOCYTES # BLD AUTO: 1 10E3/UL (ref 0.8–5.3)
LYMPHOCYTES NFR BLD AUTO: 17 %
MAGNESIUM SERPL-MCNC: 2.2 MG/DL (ref 1.6–2.3)
MCH RBC QN AUTO: 28.1 PG (ref 26.5–33)
MCHC RBC AUTO-ENTMCNC: 32.3 G/DL (ref 31.5–36.5)
MCV RBC AUTO: 87 FL (ref 78–100)
MONOCYTES # BLD AUTO: 0.9 10E3/UL (ref 0–1.3)
MONOCYTES NFR BLD AUTO: 14 %
NEUTROPHILS # BLD AUTO: 4.1 10E3/UL (ref 1.6–8.3)
NEUTROPHILS NFR BLD AUTO: 66 %
NRBC # BLD AUTO: 0 10E3/UL
NRBC BLD AUTO-RTO: 0 /100
PHOSPHATE SERPL-MCNC: 3.4 MG/DL (ref 2.5–4.5)
PLATELET # BLD AUTO: 109 10E3/UL (ref 150–450)
POTASSIUM BLD-SCNC: 3.8 MMOL/L (ref 3.4–5.3)
PROCALCITONIN SERPL-MCNC: 0.51 NG/ML
PROT SERPL-MCNC: 7.8 G/DL (ref 6.8–8.8)
RBC # BLD AUTO: 4.27 10E6/UL (ref 4.4–5.9)
SODIUM SERPL-SCNC: 140 MMOL/L (ref 133–144)
SPECIMEN EXPIRATION DATE: NORMAL
WBC # BLD AUTO: 6.2 10E3/UL (ref 4–11)

## 2022-01-26 PROCEDURE — 83690 ASSAY OF LIPASE: CPT | Performed by: EMERGENCY MEDICINE

## 2022-01-26 PROCEDURE — 36415 COLL VENOUS BLD VENIPUNCTURE: CPT | Performed by: EMERGENCY MEDICINE

## 2022-01-26 PROCEDURE — 84100 ASSAY OF PHOSPHORUS: CPT

## 2022-01-26 PROCEDURE — 85025 COMPLETE CBC W/AUTO DIFF WBC: CPT | Performed by: EMERGENCY MEDICINE

## 2022-01-26 PROCEDURE — 82140 ASSAY OF AMMONIA: CPT | Performed by: EMERGENCY MEDICINE

## 2022-01-26 PROCEDURE — 83735 ASSAY OF MAGNESIUM: CPT

## 2022-01-26 PROCEDURE — 99285 EMERGENCY DEPT VISIT HI MDM: CPT | Performed by: EMERGENCY MEDICINE

## 2022-01-26 PROCEDURE — 82077 ASSAY SPEC XCP UR&BREATH IA: CPT | Performed by: EMERGENCY MEDICINE

## 2022-01-26 PROCEDURE — 84145 PROCALCITONIN (PCT): CPT | Performed by: INTERNAL MEDICINE

## 2022-01-26 PROCEDURE — 80143 DRUG ASSAY ACETAMINOPHEN: CPT

## 2022-01-26 PROCEDURE — 99285 EMERGENCY DEPT VISIT HI MDM: CPT | Mod: 25 | Performed by: EMERGENCY MEDICINE

## 2022-01-26 PROCEDURE — 86140 C-REACTIVE PROTEIN: CPT | Performed by: INTERNAL MEDICINE

## 2022-01-26 PROCEDURE — 74174 CTA ABD&PLVS W/CONTRAST: CPT | Mod: 26 | Performed by: RADIOLOGY

## 2022-01-26 PROCEDURE — 85730 THROMBOPLASTIN TIME PARTIAL: CPT | Performed by: EMERGENCY MEDICINE

## 2022-01-26 PROCEDURE — 85610 PROTHROMBIN TIME: CPT | Performed by: EMERGENCY MEDICINE

## 2022-01-26 PROCEDURE — 250N000011 HC RX IP 250 OP 636: Performed by: EMERGENCY MEDICINE

## 2022-01-26 PROCEDURE — 74174 CTA ABD&PLVS W/CONTRAST: CPT

## 2022-01-26 PROCEDURE — 120N000001 HC R&B MED SURG/OB

## 2022-01-26 PROCEDURE — 80053 COMPREHEN METABOLIC PANEL: CPT | Performed by: EMERGENCY MEDICINE

## 2022-01-26 RX ORDER — SODIUM CHLORIDE 9 MG/ML
INJECTION, SOLUTION INTRAVENOUS CONTINUOUS
Status: DISCONTINUED | OUTPATIENT
Start: 2022-01-26 | End: 2022-01-27

## 2022-01-26 RX ORDER — IOPAMIDOL 755 MG/ML
124 INJECTION, SOLUTION INTRAVASCULAR ONCE
Status: COMPLETED | OUTPATIENT
Start: 2022-01-26 | End: 2022-01-26

## 2022-01-26 RX ORDER — PIPERACILLIN SODIUM, TAZOBACTAM SODIUM 4; .5 G/20ML; G/20ML
4.5 INJECTION, POWDER, LYOPHILIZED, FOR SOLUTION INTRAVENOUS EVERY 8 HOURS
Status: DISCONTINUED | OUTPATIENT
Start: 2022-01-26 | End: 2022-01-27

## 2022-01-26 RX ORDER — ONDANSETRON 2 MG/ML
4 INJECTION INTRAMUSCULAR; INTRAVENOUS EVERY 30 MIN PRN
Status: DISCONTINUED | OUTPATIENT
Start: 2022-01-26 | End: 2022-01-29 | Stop reason: HOSPADM

## 2022-01-26 RX ORDER — OCTREOTIDE ACETATE 50 UG/ML
50 INJECTION, SOLUTION INTRAVENOUS; SUBCUTANEOUS ONCE
Status: COMPLETED | OUTPATIENT
Start: 2022-01-26 | End: 2022-01-27

## 2022-01-26 RX ORDER — LIDOCAINE 40 MG/G
CREAM TOPICAL
Status: DISCONTINUED | OUTPATIENT
Start: 2022-01-26 | End: 2022-01-29 | Stop reason: HOSPADM

## 2022-01-26 RX ADMIN — IOPAMIDOL 124 ML: 755 INJECTION, SOLUTION INTRAVENOUS at 19:59

## 2022-01-26 ASSESSMENT — ACTIVITIES OF DAILY LIVING (ADL)
ADLS_ACUITY_SCORE: 7
ADLS_ACUITY_SCORE: 7

## 2022-01-26 ASSESSMENT — MIFFLIN-ST. JEOR: SCORE: 1929.27

## 2022-01-26 NOTE — ED TRIAGE NOTES
Pt arrives BIBA from home w/ c/o worsening hematemesis. Hx cirrhosis and alcohol withdrawal states last drink as today 1549.  RUQ pain, attributes this to liver. States throat pain as well. Jaundiced. Endorses emesis as being brownish in color, states as red today.

## 2022-01-26 NOTE — ED PROVIDER NOTES
[place] EMERGENCY DEPARTMENT ([facility])  1/26/22    History     Chief Complaint   Patient presents with     Hematemesis     HPI  Tulio Rosales is a 26 year old male with PMH significant for alcohol dependence, alcoholic hepatitis, alcohol withdrawal seizures, schizophrenia, psoriasis, and HTN who presents to the Emergency Department for evaluation of hematemesis.  The patient reports he started having right upper quadrant abdominal pain associated with nausea and vomiting yesterday.  Yesterday, his emesis was dark/brown, today he noticed more red blood.  He does have a history of alcohol abuse and liver disease.  His last drink today was a little before 4 PM.  The patient denies known history of esophageal varices.  No prior abdominal surgical history.  He does not take any blood thinning medications.    Past Medical History  Past Medical History:   Diagnosis Date     Admitted to substance misuse detoxification center      Polyarthritis of upper arm 09/18/2019    L UE     Past Surgical History:   Procedure Laterality Date     NO HISTORY OF SURGERY       folic acid (FOLVITE) 1 MG tablet  Multiple Vitamins-Minerals (SUPER THERA VINEET M) TABS  tetrahydrozoline (VISINE) 0.05 % ophthalmic solution  thiamine (B-1) 100 MG tablet      Allergies   Allergen Reactions     Oxycodone Nausea and Vomiting     Severe emesis with opioid medications     Family History  Family History   Problem Relation Age of Onset     Diabetes Mother      Diabetes Brother      Arthritis Brother         Rheumatoid     Social History   Social History     Tobacco Use     Smoking status: Former Smoker     Packs/day: 0.00     Smokeless tobacco: Never Used   Substance Use Topics     Alcohol use: Yes     Comment: 10-12 ounces a day of 80 proof vodka     Drug use: Never      Past medical history, past surgical history, medications, allergies, family history, and social history were reviewed with the patient. No additional pertinent items.        Review of Systems   General: No fevers or chills  Skin: No rash or diaphoresis  Eyes: No eye redness or discharge  Ears/Nose/Throat: No rhinorrhea or nasal congestion  Respiratory: No cough or SOB  Cardiovascular: No chest pain or palpitations  Gastrointestinal: No nausea, vomiting, or diarrhea  Genitourinary: No urinary frequency, hematuria, or dysuria  Musculoskeletal: No arthralgias or myalgias  Neurologic: No numbness or weakness  Psychiatric: No depression or SI  Hematologic/Lymphatic/Immunologic: No leg swelling, no easy bruising/bleeding  Endocrine: No polyuria/polydypsia      A complete review of systems was performed with pertinent positives and negatives noted in the HPI, and all other systems negative.    Physical Exam   BP: 131/77  Pulse: (!) 121  Temp: 98  F (36.7  C)  Resp: 16  Height: 182.9 cm (6')  Weight: 91.6 kg (202 lb)  SpO2: 100 %  Physical Exam  General: Well nourished, well developed, NAD  HEENT: EOMI, anicteric. NCAT, MMM  Neck: no jugular venous distension, supple, nl ROM  Cardiac: Regular rate and rhythm. No murmurs, rubs, or gallops. Normal S1, S2.  Intact peripheral pulses  Pulm: CTAB, no stridor, wheezes, rales, rhonchi  Abd: Soft, TTP RUQ w/o rebound or guarding, nondistended.  No masses palpated.    Skin: Warm and dry to the touch.  No rash  Extremities: No LE edema, no cyanosis, w/w/p  Neuro: A&Ox3, no gross focal deficits        ED Course      Procedures       The medical record was reviewed and interpreted.  Current labs reviewed and interpreted.  Previous labs reviewed and interpreted.  Current images reviewed and interpreted:  .  Previous images reviewed and interpreted:  .              Results for orders placed or performed during the hospital encounter of 01/26/22   CTA Abdomen Pelvis with Contrast     Status: None    Narrative    EXAMINATION: CTA ABDOMEN PELVIS WITH CONTRAST, 1/26/2022 8:19 PM    INDICATION: GI bleed    COMPARISON STUDY: None    TECHNIQUE: CT scan of the  abdomen and pelvis was performed on  multidetector CT scanner using volumetric acquisition technique and  images were reconstructed in multiple planes with variable thickness  and reviewed on dedicated workstations. Per GI bleeding protocol the  abdomen and pelvis was scanned in noncontrast, arterial and delayed 80  second venous phase.    CONTRAST: iopamidol (ISOVUE-370) solution 124 mL injected IV    CT scan radiation dose is optimized to minimum requisite dose using  automated dose modulation techniques.    FINDINGS:    Lower thorax: Normal.    Liver: Diffuse and heterogeneous hypoattenuation of the liver on  arterial and venous phase imaging with a hepatic contour..    Biliary System: Gallbladder is compressed with trace pericholecystic  fluid.    Pancreas: No mass or pancreatic ductal dilation.    Adrenal glands: No mass or nodules    Spleen: Normal.    Kidneys: No suspicious mass, obstructing calculus or hydronephrosis.    Gastrointestinal tract : Large gastroesophageal varices. Mild wall  thickening/edema surrounding the distal esophagus and stomach. Normal  caliber small bowel.  Visualized appendix is normal.    Mesentery/peritoneum/retroperitoneum: No mass. No free fluid or air.    Lymph nodes: Prominent portacaval and retroperitoneal lymph nodes. No  suspicious lymphadenopathy.    Vasculature: Recannulized umbilical vein. The major intra-abdominal  vasculature is patent. No evidence of extravasation of contrast  including endoluminal extravasation suggest a GI bleed.    Pelvis: Urinary bladder is normal.    Osseous structures: No aggressive or acute osseous lesion.  Mild  degenerative disc disease in the lower thoracic spine.      Soft tissues: Soft tissues density and stranding in the subcutaneous  tissues overlying the left groin (series 7 image 428).      Impression    IMPRESSION:   1.  No evidence of contrast extravasation to suggest acute GI  hemorrhage.  2. Cirrhotic configuration of the liver, given  its heterogeneous  enhancement, superimposed acute hepatitis is not excluded. Underlying  fatty liver.  3. Features suggestive of portal venous hypertension including  recanalized umbilical vein, enlarged portal vein, and multiple large  gastroesophageal varices.  4. Mild gastroesophageal wall thickening and edema, reactive vs  gastroenteritis.  5. Soft tissue density in the subcutaneous tissues of the left groin,  correlate with medication injection site or bruising. Developing  phlegmon/abscess is not excluded.    I have personally reviewed the examination and initial interpretation  and I agree with the findings.    DINO NASSAR MD         SYSTEM ID:  [id number]   Comprehensive metabolic panel     Status: Abnormal   Result Value Ref Range    Sodium 140 133 - 144 mmol/L    Potassium 3.8 3.4 - 5.3 mmol/L    Chloride 106 94 - 109 mmol/L    Carbon Dioxide (CO2) 23 20 - 32 mmol/L    Anion Gap 11 3 - 14 mmol/L    Urea Nitrogen 11 7 - 30 mg/dL    Creatinine 0.53 (L) 0.66 - 1.25 mg/dL    Calcium 8.9 8.5 - 10.1 mg/dL    Glucose 147 (H) 70 - 99 mg/dL    Alkaline Phosphatase 151 (H) 40 - 150 U/L     (H) 0 - 45 U/L    ALT 93 (H) 0 - 70 U/L    Protein Total 7.8 6.8 - 8.8 g/dL    Albumin 3.2 (L) 3.4 - 5.0 g/dL    Bilirubin Total 8.5 (H) 0.2 - 1.3 mg/dL    GFR Estimate >90 >60 mL/min/1.73m2   Lipase     Status: Normal   Result Value Ref Range    Lipase 92 73 - 393 U/L   INR     Status: Abnormal   Result Value Ref Range    INR 1.53 (H) 0.85 - 1.15   Partial thromboplastin time     Status: Abnormal   Result Value Ref Range    aPTT 39 (H) 22 - 38 Seconds   Ethyl Alcohol Level     Status: Abnormal   Result Value Ref Range    Alcohol ethyl 0.20 (H) <=0.01 g/dL   CBC with platelets and differential     Status: Abnormal   Result Value Ref Range    WBC Count 6.2 4.0 - 11.0 10e3/uL    RBC Count 4.27 (L) 4.40 - 5.90 10e6/uL    Hemoglobin 12.0 (L) 13.3 - 17.7 g/dL    Hematocrit 37.1 (L) 40.0 - 53.0 %    MCV 87 78 - 100 fL     MCH 28.1 26.5 - 33.0 pg    MCHC 32.3 31.5 - 36.5 g/dL    RDW 22.1 (H) 10.0 - 15.0 %    Platelet Count 109 (L) 150 - 450 10e3/uL    % Neutrophils 66 %    % Lymphocytes 17 %    % Monocytes 14 %    % Eosinophils 1 %    % Basophils 2 %    % Immature Granulocytes 0 %    NRBCs per 100 WBC 0 <1 /100    Absolute Neutrophils 4.1 1.6 - 8.3 10e3/uL    Absolute Lymphocytes 1.0 0.8 - 5.3 10e3/uL    Absolute Monocytes 0.9 0.0 - 1.3 10e3/uL    Absolute Eosinophils 0.0 0.0 - 0.7 10e3/uL    Absolute Basophils 0.1 0.0 - 0.2 10e3/uL    Absolute Immature Granulocytes 0.0 <=0.4 10e3/uL    Absolute NRBCs 0.0 10e3/uL   Extra Red Top Tube     Status: None   Result Value Ref Range    Hold Specimen JI    Ammonia (on ice)     Status: Normal   Result Value Ref Range    Ammonia 42 10 - 50 umol/L   CRP inflammation     Status: Normal   Result Value Ref Range    CRP Inflammation 4.4 0.0 - 8.0 mg/L   Phosphorus     Status: Normal   Result Value Ref Range    Phosphorus 3.4 2.5 - 4.5 mg/dL   Magnesium     Status: Normal   Result Value Ref Range    Magnesium 2.2 1.6 - 2.3 mg/dL   CBC with platelets differential     Status: Abnormal    Narrative    The following orders were created for panel order CBC with platelets differential.  Procedure                               Abnormality         Status                     ---------                               -----------         ------                     CBC with platelets and d...[[id number]]  Abnormal            Final result                 Please view results for these tests on the individual orders.   [facility] Draw     Status: None    Narrative    The following orders were created for panel order [place] Draw.  Procedure                               Abnormality         Status                     ---------                               -----------         ------                     Extra Red Top Tube[[id number]]                               Final result                 Please view results for these  tests on the individual orders.     Medications   0.9% sodium chloride BOLUS (has no administration in time range)     Followed by   sodium chloride 0.9% infusion (has no administration in time range)   ondansetron (ZOFRAN) injection 4 mg (has no administration in time range)   octreotide (sandoSTATIN) 1,250 mcg in D5W 250 mL (has no administration in time range)   octreotide (sandoSTATIN) injection 50 mcg (has no administration in time range)   pantoprazole (PROTONIX) IV push injection 40 mg (has no administration in time range)   piperacillin-tazobactam (ZOSYN) 4.5 g vial to attach to  mL bag (has no administration in time range)   lidocaine 1 % 0.1-1 mL (has no administration in time range)   lidocaine (LMX4) cream (has no administration in time range)   sodium chloride (PF) 0.9% PF flush 3 mL (has no administration in time range)   sodium chloride (PF) 0.9% PF flush 3 mL (has no administration in time range)   melatonin tablet 1 mg (has no administration in time range)   iopamidol (ISOVUE-370) solution 124 mL (124 mLs Intravenous Given 1/26/22 1959)   sodium chloride (PF) 0.9% PF flush 91 mL (91 mLs Intravenous Given 1/26/22 2000)        Assessments & Plan (with Medical Decision Making)   The patient is a 26-year-old male who presents to the emergency department with hematemesis.  Differential diagnosis includes esophageal tear, esophageal varices, gastritis, peptic ulcer disease, alcohol withdrawal, cholelithiasis/cholecystitis, pancreatitis.  Labs, CT, urinalysis ordered to further evaluate the patient.  IV fluids, Zofran, and Protonix were given for symptomatic control in the emergency department.    Laboratory work-up is remarkable for hemoglobin 12.0, stable compared to prior.    CT shows acute hepatitis, esophageal varices without acute bleed.    Patient was discussed with gastroenterology, they recommend admission to internal medicine for monitoring.  They will consult.  Agree with Protonix.  Do  suggest adding octreotide.  No need for emergent endoscopy at this time, but if patient decompensates, they should be contacted for consideration of emergent endoscopy.  They also recommend repeat hemoglobin be checked now.  No further recommendations at this time.    I have reviewed the nursing notes. I have reviewed the findings, diagnosis, plan and need for follow up with the patient.    Patient discussed with IM, to be admitted to their service for further management. Plan was discussed with patient who understands and agrees with plan.     New Prescriptions    No medications on file       Final diagnoses:   Hematemesis with nausea       --  Pearl Johnson MD  formerly [facility] EMERGENCY DEPARTMENT  1/26/2022     Pearl Johnson MD  01/26/22 [id number]

## 2022-01-27 ENCOUNTER — APPOINTMENT (OUTPATIENT)
Dept: ULTRASOUND IMAGING | Facility: CLINIC | Age: 27
DRG: 368 | End: 2022-01-27
Attending: STUDENT IN AN ORGANIZED HEALTH CARE EDUCATION/TRAINING PROGRAM

## 2022-01-27 ENCOUNTER — APPOINTMENT (OUTPATIENT)
Dept: GENERAL RADIOLOGY | Facility: CLINIC | Age: 27
DRG: 368 | End: 2022-01-27

## 2022-01-27 LAB
ALBUMIN SERPL-MCNC: 2.5 G/DL (ref 3.4–5)
ALBUMIN SERPL-MCNC: 2.7 G/DL (ref 3.4–5)
ALBUMIN UR-MCNC: 30 MG/DL
ALP SERPL-CCNC: 113 U/L (ref 40–150)
ALP SERPL-CCNC: 94 U/L (ref 40–150)
ALT SERPL W P-5'-P-CCNC: 64 U/L (ref 0–70)
ALT SERPL W P-5'-P-CCNC: 75 U/L (ref 0–70)
ANION GAP SERPL CALCULATED.3IONS-SCNC: 6 MMOL/L (ref 3–14)
ANION GAP SERPL CALCULATED.3IONS-SCNC: 7 MMOL/L (ref 3–14)
APAP SERPL-MCNC: <5 UG/ML
APAP SERPL-MCNC: NORMAL UG/ML
APPEARANCE UR: CLEAR
AST SERPL W P-5'-P-CCNC: 144 U/L (ref 0–45)
AST SERPL W P-5'-P-CCNC: 165 U/L (ref 0–45)
ATRIAL RATE - MUSE: 103 BPM
ATRIAL RATE - MUSE: 105 BPM
BASOPHILS # BLD AUTO: 0.1 10E3/UL (ref 0–0.2)
BASOPHILS NFR BLD AUTO: 1 %
BILIRUB SERPL-MCNC: 7.2 MG/DL (ref 0.2–1.3)
BILIRUB SERPL-MCNC: 7.2 MG/DL (ref 0.2–1.3)
BILIRUB UR QL STRIP: ABNORMAL
BUN SERPL-MCNC: 13 MG/DL (ref 7–30)
BUN SERPL-MCNC: 13 MG/DL (ref 7–30)
CALCIUM SERPL-MCNC: 7.7 MG/DL (ref 8.5–10.1)
CALCIUM SERPL-MCNC: 8.3 MG/DL (ref 8.5–10.1)
CHLORIDE BLD-SCNC: 106 MMOL/L (ref 94–109)
CHLORIDE BLD-SCNC: 108 MMOL/L (ref 94–109)
CO2 SERPL-SCNC: 24 MMOL/L (ref 20–32)
CO2 SERPL-SCNC: 24 MMOL/L (ref 20–32)
COLOR UR AUTO: ABNORMAL
CREAT SERPL-MCNC: 0.64 MG/DL (ref 0.66–1.25)
CREAT SERPL-MCNC: 0.68 MG/DL (ref 0.66–1.25)
CRP SERPL-MCNC: 4.2 MG/L (ref 0–8)
CYCLE THRESHOLD (CT): 35.3
D DIMER PPP FEU-MCNC: 0.95 UG/ML FEU (ref 0–0.5)
DIASTOLIC BLOOD PRESSURE - MUSE: NORMAL MMHG
DIASTOLIC BLOOD PRESSURE - MUSE: NORMAL MMHG
EOSINOPHIL # BLD AUTO: 0 10E3/UL (ref 0–0.7)
EOSINOPHIL NFR BLD AUTO: 0 %
ERYTHROCYTE [DISTWIDTH] IN BLOOD BY AUTOMATED COUNT: 22.3 % (ref 10–15)
ERYTHROCYTE [DISTWIDTH] IN BLOOD BY AUTOMATED COUNT: 22.5 % (ref 10–15)
GFR SERPL CREATININE-BSD FRML MDRD: >90 ML/MIN/1.73M2
GFR SERPL CREATININE-BSD FRML MDRD: >90 ML/MIN/1.73M2
GLUCOSE BLD-MCNC: 125 MG/DL (ref 70–99)
GLUCOSE BLD-MCNC: 148 MG/DL (ref 70–99)
GLUCOSE UR STRIP-MCNC: NEGATIVE MG/DL
HCT VFR BLD AUTO: 32.1 % (ref 40–53)
HCT VFR BLD AUTO: 37 % (ref 40–53)
HGB BLD-MCNC: 10.3 G/DL (ref 13.3–17.7)
HGB BLD-MCNC: 12.1 G/DL (ref 13.3–17.7)
HGB UR QL STRIP: NEGATIVE
HOLD SPECIMEN: NORMAL
IMM GRANULOCYTES # BLD: 0 10E3/UL
IMM GRANULOCYTES NFR BLD: 0 %
INTERPRETATION ECG - MUSE: NORMAL
INTERPRETATION ECG - MUSE: NORMAL
KETONES UR STRIP-MCNC: ABNORMAL MG/DL
LACTATE SERPL-SCNC: 1 MMOL/L (ref 0.7–2)
LACTATE SERPL-SCNC: 1.6 MMOL/L (ref 0.7–2)
LACTATE SERPL-SCNC: 2.3 MMOL/L (ref 0.7–2)
LEUKOCYTE ESTERASE UR QL STRIP: NEGATIVE
LYMPHOCYTES # BLD AUTO: 0.6 10E3/UL (ref 0.8–5.3)
LYMPHOCYTES NFR BLD AUTO: 9 %
MCH RBC QN AUTO: 28.2 PG (ref 26.5–33)
MCH RBC QN AUTO: 28.5 PG (ref 26.5–33)
MCHC RBC AUTO-ENTMCNC: 32.1 G/DL (ref 31.5–36.5)
MCHC RBC AUTO-ENTMCNC: 32.7 G/DL (ref 31.5–36.5)
MCV RBC AUTO: 86 FL (ref 78–100)
MCV RBC AUTO: 89 FL (ref 78–100)
MONOCYTES # BLD AUTO: 0.8 10E3/UL (ref 0–1.3)
MONOCYTES NFR BLD AUTO: 12 %
MUCOUS THREADS #/AREA URNS LPF: PRESENT /LPF
NEUTROPHILS # BLD AUTO: 4.7 10E3/UL (ref 1.6–8.3)
NEUTROPHILS NFR BLD AUTO: 78 %
NITRATE UR QL: NEGATIVE
NRBC # BLD AUTO: 0 10E3/UL
NRBC BLD AUTO-RTO: 0 /100
P AXIS - MUSE: 48 DEGREES
P AXIS - MUSE: 57 DEGREES
PH UR STRIP: 7.5 [PH] (ref 5–7)
PLATELET # BLD AUTO: 115 10E3/UL (ref 150–450)
PLATELET # BLD AUTO: 94 10E3/UL (ref 150–450)
POTASSIUM BLD-SCNC: 4.1 MMOL/L (ref 3.4–5.3)
POTASSIUM BLD-SCNC: 4.9 MMOL/L (ref 3.4–5.3)
PR INTERVAL - MUSE: 92 MS
PR INTERVAL - MUSE: 92 MS
PROT SERPL-MCNC: 6 G/DL (ref 6.8–8.8)
PROT SERPL-MCNC: 6.5 G/DL (ref 6.8–8.8)
QRS DURATION - MUSE: 80 MS
QRS DURATION - MUSE: 80 MS
QT - MUSE: 348 MS
QT - MUSE: 388 MS
QTC - MUSE: 459 MS
QTC - MUSE: 508 MS
R AXIS - MUSE: 28 DEGREES
R AXIS - MUSE: 37 DEGREES
RBC # BLD AUTO: 3.61 10E6/UL (ref 4.4–5.9)
RBC # BLD AUTO: 4.29 10E6/UL (ref 4.4–5.9)
RBC URINE: 3 /HPF
SARS-COV-2 RNA RESP QL NAA+PROBE: POSITIVE
SODIUM SERPL-SCNC: 137 MMOL/L (ref 133–144)
SODIUM SERPL-SCNC: 138 MMOL/L (ref 133–144)
SP GR UR STRIP: 1.03 (ref 1–1.03)
SYSTOLIC BLOOD PRESSURE - MUSE: NORMAL MMHG
SYSTOLIC BLOOD PRESSURE - MUSE: NORMAL MMHG
T AXIS - MUSE: -10 DEGREES
T AXIS - MUSE: 18 DEGREES
TRANSITIONAL EPI: <1 /HPF
UROBILINOGEN UR STRIP-MCNC: NORMAL MG/DL
VENTRICULAR RATE- MUSE: 103 BPM
VENTRICULAR RATE- MUSE: 105 BPM
WBC # BLD AUTO: 6.1 10E3/UL (ref 4–11)
WBC # BLD AUTO: 7.3 10E3/UL (ref 4–11)
WBC URINE: 1 /HPF

## 2022-01-27 PROCEDURE — 93005 ELECTROCARDIOGRAM TRACING: CPT | Performed by: EMERGENCY MEDICINE

## 2022-01-27 PROCEDURE — 86140 C-REACTIVE PROTEIN: CPT

## 2022-01-27 PROCEDURE — 99207 PR APP CREDIT; MD BILLING SHARED VISIT: CPT | Performed by: STUDENT IN AN ORGANIZED HEALTH CARE EDUCATION/TRAINING PROGRAM

## 2022-01-27 PROCEDURE — 36415 COLL VENOUS BLD VENIPUNCTURE: CPT | Performed by: STUDENT IN AN ORGANIZED HEALTH CARE EDUCATION/TRAINING PROGRAM

## 2022-01-27 PROCEDURE — 83605 ASSAY OF LACTIC ACID: CPT | Performed by: STUDENT IN AN ORGANIZED HEALTH CARE EDUCATION/TRAINING PROGRAM

## 2022-01-27 PROCEDURE — 250N000011 HC RX IP 250 OP 636: Performed by: STUDENT IN AN ORGANIZED HEALTH CARE EDUCATION/TRAINING PROGRAM

## 2022-01-27 PROCEDURE — 99223 1ST HOSP IP/OBS HIGH 75: CPT | Mod: GC | Performed by: INTERNAL MEDICINE

## 2022-01-27 PROCEDURE — 81001 URINALYSIS AUTO W/SCOPE: CPT | Performed by: STUDENT IN AN ORGANIZED HEALTH CARE EDUCATION/TRAINING PROGRAM

## 2022-01-27 PROCEDURE — HZ2ZZZZ DETOXIFICATION SERVICES FOR SUBSTANCE ABUSE TREATMENT: ICD-10-PCS | Performed by: STUDENT IN AN ORGANIZED HEALTH CARE EDUCATION/TRAINING PROGRAM

## 2022-01-27 PROCEDURE — 87040 BLOOD CULTURE FOR BACTERIA: CPT | Performed by: INTERNAL MEDICINE

## 2022-01-27 PROCEDURE — 83605 ASSAY OF LACTIC ACID: CPT | Performed by: INTERNAL MEDICINE

## 2022-01-27 PROCEDURE — 250N000011 HC RX IP 250 OP 636: Performed by: INTERNAL MEDICINE

## 2022-01-27 PROCEDURE — 96361 HYDRATE IV INFUSION ADD-ON: CPT | Performed by: EMERGENCY MEDICINE

## 2022-01-27 PROCEDURE — 96376 TX/PRO/DX INJ SAME DRUG ADON: CPT | Performed by: EMERGENCY MEDICINE

## 2022-01-27 PROCEDURE — 96365 THER/PROPH/DIAG IV INF INIT: CPT | Performed by: EMERGENCY MEDICINE

## 2022-01-27 PROCEDURE — C9803 HOPD COVID-19 SPEC COLLECT: HCPCS | Performed by: EMERGENCY MEDICINE

## 2022-01-27 PROCEDURE — 71045 X-RAY EXAM CHEST 1 VIEW: CPT | Mod: 26 | Performed by: RADIOLOGY

## 2022-01-27 PROCEDURE — 93975 VASCULAR STUDY: CPT | Mod: 26 | Performed by: RADIOLOGY

## 2022-01-27 PROCEDURE — 250N000009 HC RX 250

## 2022-01-27 PROCEDURE — 85027 COMPLETE CBC AUTOMATED: CPT | Performed by: STUDENT IN AN ORGANIZED HEALTH CARE EDUCATION/TRAINING PROGRAM

## 2022-01-27 PROCEDURE — 96366 THER/PROPH/DIAG IV INF ADDON: CPT | Performed by: EMERGENCY MEDICINE

## 2022-01-27 PROCEDURE — 120N000001 HC R&B MED SURG/OB

## 2022-01-27 PROCEDURE — 258N000003 HC RX IP 258 OP 636: Performed by: EMERGENCY MEDICINE

## 2022-01-27 PROCEDURE — 250N000013 HC RX MED GY IP 250 OP 250 PS 637

## 2022-01-27 PROCEDURE — 85379 FIBRIN DEGRADATION QUANT: CPT | Performed by: INTERNAL MEDICINE

## 2022-01-27 PROCEDURE — 82040 ASSAY OF SERUM ALBUMIN: CPT

## 2022-01-27 PROCEDURE — 85025 COMPLETE CBC W/AUTO DIFF WBC: CPT | Performed by: EMERGENCY MEDICINE

## 2022-01-27 PROCEDURE — 96375 TX/PRO/DX INJ NEW DRUG ADDON: CPT | Performed by: EMERGENCY MEDICINE

## 2022-01-27 PROCEDURE — 96368 THER/DIAG CONCURRENT INF: CPT | Performed by: EMERGENCY MEDICINE

## 2022-01-27 PROCEDURE — 36415 COLL VENOUS BLD VENIPUNCTURE: CPT | Performed by: INTERNAL MEDICINE

## 2022-01-27 PROCEDURE — 258N000003 HC RX IP 258 OP 636

## 2022-01-27 PROCEDURE — 250N000013 HC RX MED GY IP 250 OP 250 PS 637: Performed by: STUDENT IN AN ORGANIZED HEALTH CARE EDUCATION/TRAINING PROGRAM

## 2022-01-27 PROCEDURE — C9113 INJ PANTOPRAZOLE SODIUM, VIA: HCPCS | Performed by: INTERNAL MEDICINE

## 2022-01-27 PROCEDURE — 93975 VASCULAR STUDY: CPT

## 2022-01-27 PROCEDURE — 80053 COMPREHEN METABOLIC PANEL: CPT

## 2022-01-27 PROCEDURE — 86901 BLOOD TYPING SEROLOGIC RH(D): CPT | Performed by: EMERGENCY MEDICINE

## 2022-01-27 PROCEDURE — 250N000011 HC RX IP 250 OP 636: Performed by: EMERGENCY MEDICINE

## 2022-01-27 PROCEDURE — 71045 X-RAY EXAM CHEST 1 VIEW: CPT

## 2022-01-27 PROCEDURE — 84450 TRANSFERASE (AST) (SGOT): CPT | Performed by: STUDENT IN AN ORGANIZED HEALTH CARE EDUCATION/TRAINING PROGRAM

## 2022-01-27 PROCEDURE — U0005 INFEC AGEN DETEC AMPLI PROBE: HCPCS | Performed by: INTERNAL MEDICINE

## 2022-01-27 PROCEDURE — 99207 PR APP CREDIT; MD BILLING SHARED VISIT: CPT | Performed by: INTERNAL MEDICINE

## 2022-01-27 PROCEDURE — 84155 ASSAY OF PROTEIN SERUM: CPT | Performed by: STUDENT IN AN ORGANIZED HEALTH CARE EDUCATION/TRAINING PROGRAM

## 2022-01-27 PROCEDURE — C9113 INJ PANTOPRAZOLE SODIUM, VIA: HCPCS | Performed by: STUDENT IN AN ORGANIZED HEALTH CARE EDUCATION/TRAINING PROGRAM

## 2022-01-27 PROCEDURE — 250N000011 HC RX IP 250 OP 636

## 2022-01-27 PROCEDURE — 99223 1ST HOSP IP/OBS HIGH 75: CPT | Mod: AI | Performed by: INTERNAL MEDICINE

## 2022-01-27 RX ORDER — PIPERACILLIN SODIUM, TAZOBACTAM SODIUM 4; .5 G/20ML; G/20ML
4.5 INJECTION, POWDER, LYOPHILIZED, FOR SOLUTION INTRAVENOUS EVERY 6 HOURS
Status: DISCONTINUED | OUTPATIENT
Start: 2022-01-27 | End: 2022-01-27

## 2022-01-27 RX ORDER — MULTIPLE VITAMINS W/ MINERALS TAB 9MG-400MCG
1 TAB ORAL DAILY
Status: DISCONTINUED | OUTPATIENT
Start: 2022-01-27 | End: 2022-01-29 | Stop reason: HOSPADM

## 2022-01-27 RX ORDER — FOLIC ACID 1 MG/1
1 TABLET ORAL DAILY
Status: DISCONTINUED | OUTPATIENT
Start: 2022-01-27 | End: 2022-01-29 | Stop reason: HOSPADM

## 2022-01-27 RX ORDER — LORAZEPAM 0.5 MG/1
1-2 TABLET ORAL EVERY 30 MIN PRN
Status: DISCONTINUED | OUTPATIENT
Start: 2022-01-27 | End: 2022-01-29 | Stop reason: HOSPADM

## 2022-01-27 RX ORDER — CEFTRIAXONE 2 G/1
2 INJECTION, POWDER, FOR SOLUTION INTRAMUSCULAR; INTRAVENOUS EVERY 24 HOURS
Status: DISCONTINUED | OUTPATIENT
Start: 2022-01-27 | End: 2022-01-29 | Stop reason: HOSPADM

## 2022-01-27 RX ORDER — GABAPENTIN 600 MG/1
600 TABLET ORAL 3 TIMES DAILY
Status: DISCONTINUED | OUTPATIENT
Start: 2022-01-27 | End: 2022-01-29 | Stop reason: HOSPADM

## 2022-01-27 RX ADMIN — FOLIC ACID 1 MG: 1 TABLET ORAL at 09:25

## 2022-01-27 RX ADMIN — CEFTRIAXONE SODIUM 2 G: 2 INJECTION, POWDER, FOR SOLUTION INTRAMUSCULAR; INTRAVENOUS at 13:49

## 2022-01-27 RX ADMIN — SODIUM CHLORIDE: 9 INJECTION, SOLUTION INTRAVENOUS at 06:57

## 2022-01-27 RX ADMIN — THIAMINE HCL TAB 100 MG 100 MG: 100 TAB at 09:25

## 2022-01-27 RX ADMIN — Medication 1 TABLET: at 09:25

## 2022-01-27 RX ADMIN — LORAZEPAM 2 MG: 0.5 TABLET ORAL at 06:12

## 2022-01-27 RX ADMIN — LORAZEPAM 2 MG: 0.5 TABLET ORAL at 02:09

## 2022-01-27 RX ADMIN — LORAZEPAM 2 MG: 0.5 TABLET ORAL at 14:38

## 2022-01-27 RX ADMIN — LORAZEPAM 2 MG: 0.5 TABLET ORAL at 21:14

## 2022-01-27 RX ADMIN — GABAPENTIN 600 MG: 600 TABLET, FILM COATED ORAL at 19:09

## 2022-01-27 RX ADMIN — PIPERACILLIN AND TAZOBACTAM 4.5 G: 4; .5 INJECTION, POWDER, LYOPHILIZED, FOR SOLUTION INTRAVENOUS; PARENTERAL at 11:24

## 2022-01-27 RX ADMIN — OCTREOTIDE ACETATE 50 MCG: 50 INJECTION, SOLUTION INTRAVENOUS; SUBCUTANEOUS at 01:10

## 2022-01-27 RX ADMIN — LORAZEPAM 1 MG: 0.5 TABLET ORAL at 11:23

## 2022-01-27 RX ADMIN — LORAZEPAM 2 MG: 0.5 TABLET ORAL at 12:23

## 2022-01-27 RX ADMIN — LORAZEPAM 2 MG: 0.5 TABLET ORAL at 21:43

## 2022-01-27 RX ADMIN — LORAZEPAM 2 MG: 0.5 TABLET ORAL at 09:19

## 2022-01-27 RX ADMIN — OCTREOTIDE ACETATE 50 MCG/HR: 200 INJECTION, SOLUTION INTRAVENOUS; SUBCUTANEOUS at 01:09

## 2022-01-27 RX ADMIN — LORAZEPAM 2 MG: 0.5 TABLET ORAL at 10:13

## 2022-01-27 RX ADMIN — LORAZEPAM 2 MG: 0.5 TABLET ORAL at 20:35

## 2022-01-27 RX ADMIN — SODIUM CHLORIDE 1000 ML: 900 INJECTION, SOLUTION INTRAVENOUS at 02:00

## 2022-01-27 RX ADMIN — PIPERACILLIN AND TAZOBACTAM 4.5 G: 4; .5 INJECTION, POWDER, LYOPHILIZED, FOR SOLUTION INTRAVENOUS; PARENTERAL at 06:13

## 2022-01-27 RX ADMIN — LORAZEPAM 2 MG: 0.5 TABLET ORAL at 13:38

## 2022-01-27 RX ADMIN — ONDANSETRON 4 MG: 2 INJECTION INTRAMUSCULAR; INTRAVENOUS at 06:13

## 2022-01-27 RX ADMIN — PANTOPRAZOLE SODIUM 40 MG: 40 INJECTION, POWDER, FOR SOLUTION INTRAVENOUS at 01:20

## 2022-01-27 RX ADMIN — ONDANSETRON 4 MG: 2 INJECTION INTRAMUSCULAR; INTRAVENOUS at 02:08

## 2022-01-27 RX ADMIN — PANTOPRAZOLE SODIUM 40 MG: 40 INJECTION, POWDER, FOR SOLUTION INTRAVENOUS at 09:20

## 2022-01-27 RX ADMIN — SODIUM CHLORIDE, POTASSIUM CHLORIDE, SODIUM LACTATE AND CALCIUM CHLORIDE 500 ML: 600; 310; 30; 20 INJECTION, SOLUTION INTRAVENOUS at 06:13

## 2022-01-27 RX ADMIN — FOLIC ACID: 5 INJECTION, SOLUTION INTRAMUSCULAR; INTRAVENOUS; SUBCUTANEOUS at 01:19

## 2022-01-27 RX ADMIN — PANTOPRAZOLE SODIUM 40 MG: 40 INJECTION, POWDER, FOR SOLUTION INTRAVENOUS at 19:09

## 2022-01-27 ASSESSMENT — ACTIVITIES OF DAILY LIVING (ADL)
ADLS_ACUITY_SCORE: 7
ADLS_ACUITY_SCORE: 8
ADLS_ACUITY_SCORE: 7
ADLS_ACUITY_SCORE: 8
ADLS_ACUITY_SCORE: 7
ADLS_ACUITY_SCORE: 7
ADLS_ACUITY_SCORE: 8
ADLS_ACUITY_SCORE: 7
ADLS_ACUITY_SCORE: 8
ADLS_ACUITY_SCORE: 7

## 2022-01-27 NOTE — PROGRESS NOTES
Brief Hepatology Note:    01/26/22    #. EtOH hepatitis (AST > ALT), recurrent  #. Hyperbilirubinemia  #. Question of EtOH cirrhosis  #. Portal HTN on imaging  #. Hematemesis  #. AUD, severe  #. Elevated INR  #. Thrombocytopenia, mild anemia  HDS, hgb near baseline, no overt bleeding in ED since arrival. Large varices and recanalized umbilical vein on CTA, GE wall edema, no active bleed. INR elevated, albumin depressed, hyperbilirubinemia, though spleen is normal and no ascites to mention. Thrombocyotopenia either direct toxicity from EtOH vs early hypersplenism (pre-radiographic). Patient likely manifesting recurrent EtOH hepatitis with possible beginning of cirrhosis, though painful hepatomegaly (mentioned in other notes and with ED provider) is more suggestive of EtOH hepatitis. Will need full hepatic evaluation outside of acute setting as many imaging and laboratory findings can be seen in both conditions. Hematemeis most likely to be MWT, though gastritis, esophagitis, PUD possible. Clinically not behaving as a variceal hemorrhage at this time, though will monitor closely.     Initial Recommendations:  -- no need for urgent endoscopy overnight   -- NPO  -- telemetry, large bore IV access  -- IV PPI BID, IV octreotide (given varices on CTA), IV ceftriaxone  -- repeat hgb now, then trend hgb per primary team, defend hgb of 7 from GI perspective  -- could consider complete abdominal U/S with dopplers  -- INR, CMP, CBC daily  -- COVID swab for possible endoscopy  -- page GI fellow on call with any hemodynamically significant overt GIB    Patient will be formally staffed with hepatology attending in AM.     Eldon Andrea MD, PGY4  Gastroenterology Fellow  [facility]  See AMCOM/Esme for GI on-call information      Vitals:  /77   Pulse (!) 121   Temp 98  F (36.7  C) (Oral)   Resp 16   Ht 1.829 m (6')   Wt 91.6 kg (202 lb)   SpO2 100%   BMI 27.40 kg/m      Labs:  Lab Results   Component  Value Date    WBC 6.2 01/26/2022    HGB 12.0 (L) 01/26/2022    HCT 37.1 (L) 01/26/2022     (L) 01/26/2022     01/26/2022    POTASSIUM 3.8 01/26/2022    CHLORIDE 106 01/26/2022    CO2 23 01/26/2022    BUN 11 01/26/2022    CR 0.53 (L) 01/26/2022     (H) 01/26/2022    SED 44 (H) 09/18/2019     (H) 01/26/2022    ALT 93 (H) 01/26/2022    GGT 2,379 (H) 05/11/2021    ALKPHOS 151 (H) 01/26/2022    BILITOTAL 8.5 (H) 01/26/2022    GRACIELA 42 01/26/2022    INR 1.53 (H) 01/26/2022     Imaging:  CTA: 1/25/22    IMPRESSION:   1.  No evidence of contrast extravasation to suggest acute GI  hemorrhage.  2. Cirrhotic configuration of the liver, given its heterogeneous  enhancement, superimposed acute hepatitis is not excluded. Underlying  fatty liver.  3. Features suggestive of portal venous hypertension including  recanalized umbilical vein, enlarged portal vein, and multiple large  gastroesophageal varices.  4. Mild gastroesophageal wall thickening and edema, reactive vs  gastroenteritis.  5. Soft tissue density in the subcutaneous tissues of the left groin,  correlate with medication injection site or bruising. Developing  phlegmon/abscess is not excluded.     I have personally reviewed the examination and initial interpretation  and I agree with the findings.     DINO NASSAR MD     ENDOSCOPY:  None to review

## 2022-01-27 NOTE — CONSULTS
[facility]    Hepatology Consult    Requesting provider: Dr. Martin    Consult requested for Patient with alcoholic hepatitis and GI bleeding      HPI:  26 year old male with Hx of alcoholic hepatitis and AUD, schizophrenia, psoriasis, HTN who came with concern for coffee ground emesis.    Pt stated that he noticed his stool to be very dark/black 2 days back, had multiple episodes loose to soft. No hematochezia or abd pain associated with it. He was nauseous yesterday and had initial episode at 4pm with coffee ground/dark material. He came to ED to get evaluated. He had another episode while in ED which was bright red in color.    Pt stated that he has been drinking 6-8 shots of vodka everyday since age of 19, he has increased his alcohol intake up to a liter of vodka daily, last drink on 1/26 at 3:45 PM.  Stated that he had inpatient rehab/chemical dependency treatment in the past but not sure whether in 2011 or 2021.  States that he never had withdrawal seizure but whenever he is feeling as he is going in withdrawal he takes alcohol again.    Patient was diagnosed with schizophrenia but is not following with psychiatry, he has history of auditory and visual hallucinations.    Lives with his significant other who is also admitted for withdrawal.  Denies Marijuana, cocaine or smoking.    No family history of liver diseases.    Works in a [employer] place.      Medical hx Surgical hx   Past Medical History:   Diagnosis Date     Admitted to substance misuse detoxification center      Polyarthritis of upper arm 09/18/2019    L UE      Past Surgical History:   Procedure Laterality Date     NO HISTORY OF SURGERY            Medications  Current Facility-Administered Medications   Medication Dose Route Frequency     folic acid  1 mg Oral Daily     multivitamin w/minerals  1 tablet Oral Daily     pantoprazole (PROTONIX) IV  40 mg Intravenous BID     piperacillin-tazobactam  4.5 g Intravenous Q6H      sodium chloride (PF)  3 mL Intracatheter Q8H     IV Fluid with vitamins   Intravenous Q24H     thiamine  100 mg Oral Daily       Allergies  Allergies   Allergen Reactions     Oxycodone Nausea and Vomiting     Severe emesis with opioid medications       Family hx Social hx   Family History   Problem Relation Age of Onset     Diabetes Mother      Diabetes Brother      Arthritis Brother         Rheumatoid      Social History     Tobacco Use     Smoking status: Former Smoker     Packs/day: 0.00     Smokeless tobacco: Never Used   Substance Use Topics     Alcohol use: Yes     Comment: 10-12 ounces a day of 80 proof vodka     Drug use: Never          Review of systems  A 10-point review of systems was negative.      Examination  BP (!) 141/85   Pulse 105   Temp 98  F (36.7  C) (Oral)   Resp 18   Ht 1.829 m (6')   Wt 91.6 kg (202 lb)   SpO2 98%   BMI 27.40 kg/m    No intake or output data in the 24 hours ending 01/27/22 0846    Gen-  A+Ox3  Eye- EOMI, scleral icterus  ENT- MMM  CVS- Sinus tachy  RS- CTA  Abd-ND, mild epigastric tenderness  Extr- no JOSE  Neuro- awake and alert  Skin- B/LE rash    Laboratory  Lab Results   Component Value Date     01/27/2022     05/13/2021    POTASSIUM 4.9 01/27/2022    POTASSIUM 3.6 05/13/2021    CHLORIDE 106 01/27/2022    CHLORIDE 105 05/13/2021    CO2 24 01/27/2022    CO2 24 05/13/2021    BUN 13 01/27/2022    BUN 4 05/13/2021    CR 0.64 01/27/2022    CR 0.43 05/13/2021       Lab Results   Component Value Date    BILITOTAL 7.2 01/27/2022    BILITOTAL 18.6 05/13/2021    ALT 75 01/27/2022    ALT 54 05/13/2021     01/27/2022     05/13/2021    ALKPHOS 113 01/27/2022    ALKPHOS 163 05/13/2021       Lab Results   Component Value Date    ALBUMIN 2.7 01/27/2022    ALBUMIN 2.6 05/13/2021    PROTTOTAL 6.5 01/27/2022    PROTTOTAL 7.0 05/13/2021        Lab Results   Component Value Date    WBC 6.1 01/27/2022    WBC 6.7 05/13/2021    HGB 12.1 01/27/2022    HGB 12.9  05/13/2021    MCV 86 01/27/2022    MCV 97 05/13/2021     01/27/2022    PLT 97 05/13/2021       Lab Results   Component Value Date    INR 1.53 01/26/2022    INR 1.37 05/13/2021       MELD-Na score: 18 at 1/27/2022  6:07 AM  MELD score: 18 at 1/27/2022  6:07 AM  Calculated from:  Serum Creatinine: 0.64 mg/dL (Using min of 1 mg/dL) at 1/27/2022  6:07 AM  Serum Sodium: 137 mmol/L at 1/27/2022  6:07 AM  Total Bilirubin: 7.2 mg/dL at 1/27/2022  6:07 AM  INR(ratio): 1.53 at 1/26/2022  5:39 PM  Age: 26 years  Radiology  CTA ABD 1/26/22:  IMPRESSION:   1.  No evidence of contrast extravasation to suggest acute GI  hemorrhage.  2. Cirrhotic configuration of the liver, given its heterogeneous  enhancement, superimposed acute hepatitis is not excluded. Underlying  fatty liver.  3. Features suggestive of portal venous hypertension including  recanalized umbilical vein, enlarged portal vein, and multiple large  gastroesophageal varices.  4. Mild gastroesophageal wall thickening and edema, reactive vs  gastroenteritis.  5. Soft tissue density in the subcutaneous tissues of the left groin,  correlate with medication injection site or bruising. Developing  phlegmon/abscess is not excluded.    US ABD:  IMPRESSION:   Findings associated with decompensated alcoholic cirrhosis including  hepatomegaly, hepatic steatosis, mild splenomegaly, retrograde flow  within the right and left portal veins and recanalized paraumbilical  vein.      Assessment  26 year old male with Hx of alcoholic hepatitis and AUD, schizophrenia, psoriasis, HTN who came with concern for coffee ground emesis.    Upper GI bleed  Patient had melena followed by coffee-ground emesis and an episode of hematemesis.  His hemoglobin has a very slight downtrend with no further active bleeding.  Hemodynamically he is tachycardic which can be explained by ongoing withdrawal but he is not hypotensive which points against variceal bleed.  The likely source of bleeding can  be alcohol induced gastritis, peptic ulcer disease, esophagitis and possible Martine-Garcia tear.  As patient is in withdrawal, with no evidence of active GI bleeding, we will hold off on endoscopic evaluation until he is clinically stable to undergo the procedure.    Alcoholic hepatitis MDF 35.6 Eugenie score 6  Alcohol withdrawal  Patient has alcoholic hepatitis with Madrey discriminant factor of 35.6 and Eugenie score of 6 87% 28 day survival, 79% 84 day survival.  In the concern of recent GI bleeding, will hold off on steroid therapy.  Patient will benefit from chemical dependency evaluation.    Recommendations  --Continue PPI IV twice daily  --Continue ceftriaxone, can be switched to oral Cipro once stable for total of 7 days  --Consider discontinuation of octreotide  --Continue to monitor MELD labs daily along with Hgb  --CIWA protocol and alcohol withdrawal management  --Continue thiamine and folic acid  --Alcohol cessation and CD assessment while inpatient    Thank you for involving us in this patient's care. Please do not hesitate to contact the GI service with any questions or concerns.     Pt care plan discussed with Dr. Leventhal, hepatology staff physician.    This note was created with voice recognition software, and while reviewed for accuracy, typos may remain.  Socrates Lim MD  Hepatology  #[id number]

## 2022-01-27 NOTE — H&P
[facility]    History and Physical - Medicine Service, [facility] TEAM        Date of Admission:  1/26/2022    Assessment & Plan      Tulio Rosales is a 26 year old male admitted on 1/26/2022. His PMHx include alcohol use disorder (last use ), history of withdrawal seizure, schizophrenia and hallucinations (not on medications), arthritic psoriasis, HTN and recent hospitalization on 05/13/21 for alcohol hepatitis who is admitted on 11/26 for hematemesis. He notes over the past having increased fatigue, RUQ pain, nauseas, decreased appetite along with weight loss ~10lbs, black tarry stools x 5 days and 2 episodes of hematemesis 150 ml/400 ml (last one in the ED) in the last 12 hours. He denies fever, night, sweats, chills, cough, hematuria, hemoptysis, insomnia, altered mental status, generalized abdominal pain, spontaneous skin bruises, taking NSAIDs/ACO/aspirin.    # Acute GI bleeding, hematochezia/melena  # Esophageal bleeding    5-day hx hematochezia/melena along with worsening fatigue and RUQ abdominal pain. Drinks 1/2 bottle of vodka daily. Last consumption on 01/27 at 3:45 pm. Hemodynamically stable except for tachycardia. Hb stable 12 (baseline 12.9). Differential diagnosis include upper GI bleeding 2/2 possible esophageal varices (not prior EGD), Martine-Garcia, PUD, Dieulafoy's lesion. His imaging is notable for CT positive for large gastroesophageal varices on 01/27. At this time, di    - Place 2 large IV bores (<18 gauge)  - Trend Hemoglobin Q12H  - If hemodynamically stable, administer IVbolus 1L stat  - Avoid NSAIDs, ACO or aspirin  - GI consulted, follow up recs  - Octreotide infusion 50 mcg/hr continuous, after 50 bolus  - High-intense PPI, IV pantoprazole 40mg BID  - Start Zosyn (for possible abscess in left groin). Consider switching to ceftriaxone per GI recs  - Provide supplemental O2 for SatO2>94%  - Avoid over-transfusion in possible esophageal  varices   - If EKG QT<500 msec, Ok with Zofran or Compazine  - NPO now for possible EGD on 01/27  - GI Luminal consulted    # Acute decompensated cirrhosis  # Complicated by esophageal varices, actively bleeding  # Coagulopathy  # Portal hypertension  # Hypoalbuminemia  # Ascites  # Thrombocytopenia  # Mild anemia    MELD-Na score: 19 at 1/26/2022  5:39 PM  MELD score: 19 at 1/26/2022  5:39 PM  Calculated from:  Serum Creatinine: 0.53 mg/dL (Using min of 1 mg/dL) at 1/26/2022  5:39 PM  Serum Sodium: 140 mmol/L (Using max of 137 mmol/L) at 1/26/2022  5:39 PM  Total Bilirubin: 8.5 mg/dL at 1/26/2022  5:39 PM  INR(ratio): 1.53 at 1/26/2022  5:39 PM  Age: 26 years     - Abdominal US with Doppler to rule out ascites and portal thrombosis    # Acute hepatitis, likely alcohol-related  [facility]    Persistent RUQ pain, chronic fatigue/malaise/jaundice associated with jaundice and elevated direct bilirubin/AST/ALT/INR compatible with acute hepatitis. Etiology most likely alcoholic hepatitis, AST/ALT>2.  Has negative viral serologies, HIV, RPR, ceruloplasmin and urine copper on 05/12    - Considering repeating Viral serologies, HIV, RPR, ceruloplasmin and urine copper,  - Acetaminophen level pending  - Consider pentoxifylline until bedtime<5 since patient has contraindications for steroids  - GI Hep consulted, appreciate recs    # Concern for alcohol withdrawal  Has history of withdrawal seizures. No history of DT or alcoholic hallucinations. Drinks 1/2 bottle of vodka since age 17 daily. Last drink was 200cc coffee liquor 1549 on 01/26. Tachycardic, tremor.    -  CIWA protocol with Lorazepam for elevated LFTs  - Telemetry    # Alcohol use disorder  Has been drinking since the age of 19. Had a period of sobriety for 6 months in 2018, but since then drinks up yo 1L vodka. Last drink on 01/26 3:45 pm. Has been drinking 1/2 bottle of vodka on a daily basis.     - Addiction medicine consulted    # COVID-19  infection, asymptomatic  Asymptomatic. COVID-19 positive on 01/27.    - CXR ordered  - CRP ordered    # Schizophrenia  Not following any mental health provider. Has a history of auditory and visual hallucinations. Currently without active hallucinations or active SI, no desire to harmhimself or others at any time. Not interested in taking antipsychotic medications    - Considering holding high-dose steroids in the setting of untreated schizophrenia  - Consider Mental Health consulted     # Polyarthritis   # Psoriasis  Chronic patches of dry, scaled skin to shins bilaterally. Healing patches to hands, bilaterally. Patient reports relief from Eucerin and some steroid cream however feels he will not need the steroid cream for now.     - Eucerin PRN       Diet: NPO for Medical/Clinical Reasons Except for: Meds  Clear Liquid Diet    DVT Prophylaxis: Contraindicated due to acute GI bleeding  Gusman Catheter: Not present  Fluids: NaCl 125cc/hr  Central Lines: None  Cardiac Monitoring: None  Code Status:  Full Code    Clinically Significant Risk Factors Present on Admission             # Coagulation Defect: INR = 1.53 (Ref range: 0.85 - 1.15) and/or PTT = 39 Seconds (Ref range: 22 - 38 Seconds) on admission, will monitor for bleeding  # Thrombocytopenia: Plts = 109 10e3/uL (Ref range: 150 - 450 10e3/uL) on admission, will monitor for bleeding   # Overweight: last Body mass index is 27.4 kg/m .      Disposition Plan   Expected Discharge:  >2 days   Anticipated discharge location:  Awaiting care coordination huddle  Delays:     If worsening mental status/hepatorrenal Sd        The patient's care was discussed with the Attending Physician, Dr. Martin.    Renetta Westbrook MD  Medicine Service, [facility]  Securely message with the Vocera Web Console (learn more here)  Text page via [facility] Paging/Directory   Please see signed in provider for up to date coverage  information    ______________________________________________________________________    Chief Complaint   Bright red rectal bleeding    History is obtained from the patient    History of Present Illness   Tulio Rosales is a 26 year old male admitted on 1/26/2022. His PMHx include alcohol use disorder (last use ), history of withdrawal seizure, schizophrenia and hallucinations (not on medications), arthritic psoriasis, HTN and recent hospitalization on 05/13/21 for alcohol hepatitis who is admitted on 11/26 for hematemesis. He notes over the past having increased fatigue, RUQ pain, nauseas, decreased appetite along with weight loss ~10lbs, black tarry stools x 5 days and 2 episodes of hematemesis 150 ml/400 ml (last one in the ED) in the last 12 hours. He denies fever, night, sweats, chills, cough, hematuria, hemoptysis, insomnia, altered mental status, generalized abdominal pain, spontaneous skin bruises, taking NSAIDs/ACO/aspirin.    Review of Systems    The 10 point Review of Systems is negative other than noted in the HPI or here.     Past Medical History    I have reviewed this patient's medical history and updated it with pertinent information if needed.   Past Medical History:   Diagnosis Date     Admitted to substance misuse detoxification center      Polyarthritis of upper arm 09/18/2019    L UE        Past Surgical History   I have reviewed this patient's surgical history and updated it with pertinent information if needed.  Past Surgical History:   Procedure Laterality Date     NO HISTORY OF SURGERY          Social History   I have reviewed this patient's social history and updated it with pertinent information if needed. Tulio Rosales  reports that he has quit smoking. He smoked 0.00 packs per day. He has never used smokeless tobacco. He reports current alcohol use. He reports that he does not use drugs.    Family History   I have reviewed this patient's family history and updated it with  pertinent information if needed.  Family History   Problem Relation Age of Onset     Diabetes Mother      Diabetes Brother      Arthritis Brother         Rheumatoid       Prior to Admission Medications   Prior to Admission Medications   Prescriptions Last Dose Informant Patient Reported? Taking?   Multiple Vitamins-Minerals (SUPER THERA VINEET M) TABS Unknown at Unknown time  Yes Yes   Sig: Take 1 tablet by mouth   folic acid (FOLVITE) 1 MG tablet Unknown at Unknown time  Yes Yes   Sig: Take 1 mg by mouth   tetrahydrozoline (VISINE) 0.05 % ophthalmic solution Unknown at Unknown time  Yes Yes   Sig: Apply 1 drop to eye   thiamine (B-1) 100 MG tablet Unknown at Unknown time  Yes Yes   Sig: Take 100 mg by mouth      Facility-Administered Medications: None     Allergies   Allergies   Allergen Reactions     Oxycodone Nausea and Vomiting     Severe emesis with opioid medications       Physical Exam   Vital Signs: Temp: 98  F (36.7  C) Temp src: Oral BP: 131/77 Pulse: (!) 121   Resp: 16 SpO2: 100 % O2 Device: None (Room air)    Weight: 202 lbs 0 oz    Constitutional: awake, alert, cooperative, mild acute distress, tremurous  Hematologic / Lymphatic: no cervical lymphadenopathy, scleral icterus  Respiratory: No increased work of breathing, good air exchange, clear to auscultation bilaterally, no crackles or wheezing  Cardiovascular: Normal apical impulse, regular rate and rhythm, tachycardic normal S1 and S2, no S3 or S4, and no murmur noted  GI: No scars, normal bowel sounds, soft, non-distended, non-tender, no masses palpated,  impress ascites, pain to light palpation in RUQ, hepatomegaly  Skin: no bruising or bleeding, normal skin color, texture, turgor, no redness, warmth, or swelling, no rashes and no lesions  Neurologic: Awake, alert, oriented to name, place and time.  Cranial nerves II-XII are grossly intact.  Motor is 5 out of 5 bilaterally.  Cerebellar finger to nose, heel to shin intact.  Sensory is intact.   Babinski down going, Romberg negative, and gait is normal.    Data   Data reviewed today: I reviewed all medications, new labs and imaging results over the last 24 hours. I personally reviewed no images or EKG's today.    Recent Labs   Lab 01/26/22 1739   WBC 6.2   HGB 12.0*   MCV 87   *   INR 1.53*      POTASSIUM 3.8   CHLORIDE 106   CO2 23   BUN 11   CR 0.53*   ANIONGAP 11   KAYLEN 8.9   *   ALBUMIN 3.2*   PROTTOTAL 7.8   BILITOTAL 8.5*   ALKPHOS 151*   ALT 93*   *   LIPASE 92     Most Recent 3 CBC's:Recent Labs   Lab Test 01/26/22 1739 05/13/21  1258 05/13/21  0758   WBC 6.2 6.7 6.7   HGB 12.0* 12.9* 12.4*   MCV 87 97 95   * 97* 99*     Most Recent 3 BMP's:Recent Labs   Lab Test 01/26/22 1739 05/13/21  0758 05/12/21  1604    135 136   POTASSIUM 3.8 3.6 3.8   CHLORIDE 106 105 105   CO2 23 24 24   BUN 11 4* 6*   CR 0.53* 0.43* 0.51*   ANIONGAP 11 6 8   KAYLEN 8.9 8.8 8.6   * 81 98     Most Recent 2 LFT's:Recent Labs   Lab Test 01/26/22 1739 05/13/21  0758   * 209*   ALT 93* 54   ALKPHOS 151* 163*   BILITOTAL 8.5* 18.6*     Most Recent 3 INR's:Recent Labs   Lab Test 01/26/22 1739 05/13/21  0758 05/12/21  1604   INR 1.53* 1.37* 1.38*     Most Recent 3 Creatinines:Recent Labs   Lab Test 01/26/22 1739 05/13/21  0758 05/12/21  1604   CR 0.53* 0.43* 0.51*     Most Recent 3 Hemoglobins:Recent Labs   Lab Test 01/26/22 1739 05/13/21  1258 05/13/21  0758   HGB 12.0* 12.9* 12.4*     Most Recent 3 Troponin's:No lab results found.  Most Recent 3 BNP's:No lab results found.  Most Recent D-dimer:No lab results found.  Recent Results (from the past 24 hour(s))   CTA Abdomen Pelvis with Contrast    Narrative    EXAMINATION: CTA ABDOMEN PELVIS WITH CONTRAST, 1/26/2022 8:19 PM    INDICATION: GI bleed    COMPARISON STUDY: None    TECHNIQUE: CT scan of the abdomen and pelvis was performed on  multidetector CT scanner using volumetric acquisition technique and  images were  reconstructed in multiple planes with variable thickness  and reviewed on dedicated workstations. Per GI bleeding protocol the  abdomen and pelvis was scanned in noncontrast, arterial and delayed 80  second venous phase.    CONTRAST: iopamidol (ISOVUE-370) solution 124 mL injected IV    CT scan radiation dose is optimized to minimum requisite dose using  automated dose modulation techniques.    FINDINGS:    Lower thorax: Normal.    Liver: Diffuse and heterogeneous hypoattenuation of the liver on  arterial and venous phase imaging with a hepatic contour..    Biliary System: Gallbladder is compressed with trace pericholecystic  fluid.    Pancreas: No mass or pancreatic ductal dilation.    Adrenal glands: No mass or nodules    Spleen: Normal.    Kidneys: No suspicious mass, obstructing calculus or hydronephrosis.    Gastrointestinal tract : Large gastroesophageal varices. Mild wall  thickening/edema surrounding the distal esophagus and stomach. Normal  caliber small bowel.  Visualized appendix is normal.    Mesentery/peritoneum/retroperitoneum: No mass. No free fluid or air.    Lymph nodes: Prominent portacaval and retroperitoneal lymph nodes. No  suspicious lymphadenopathy.    Vasculature: Recannulized umbilical vein. The major intra-abdominal  vasculature is patent. No evidence of extravasation of contrast  including endoluminal extravasation suggest a GI bleed.    Pelvis: Urinary bladder is normal.    Osseous structures: No aggressive or acute osseous lesion.  Mild  degenerative disc disease in the lower thoracic spine.      Soft tissues: Soft tissues density and stranding in the subcutaneous  tissues overlying the left groin (series 7 image [id number]).      Impression    IMPRESSION:   1.  No evidence of contrast extravasation to suggest acute GI  hemorrhage.  2. Cirrhotic configuration of the liver, given its heterogeneous  enhancement, superimposed acute hepatitis is not excluded. Underlying  fatty liver.  3. Features  suggestive of portal venous hypertension including  recanalized umbilical vein, enlarged portal vein, and multiple large  gastroesophageal varices.  4. Mild gastroesophageal wall thickening and edema, reactive vs  gastroenteritis.  5. Soft tissue density in the subcutaneous tissues of the left groin,  correlate with medication injection site or bruising. Developing  phlegmon/abscess is not excluded.    I have personally reviewed the examination and initial interpretation  and I agree with the findings.    DINO NASSAR MD         SYSTEM ID:  [id number]

## 2022-01-27 NOTE — PROGRESS NOTES
[facility]    Medicine Progress Note - Hospitalist Service, GOLD TEAM 11    Date of Admission:  1/26/2022    Assessment & Plan         Tulio Rosales is a 26 year old male admitted on 1/26/2022. His PMHx include alcohol use disorder (last use ), history of withdrawal seizure, schizophrenia and hallucinations (not on medications), arthritic psoriasis, HTN and recent hospitalization on 05/13/21 for alcohol hepatitis who is admitted on 11/26 for hematemesis.      Acute GI bleeding, hematochezia/melena  Esophageal bleeding  5-day hx hematochezia/melena along with worsening fatigue and RUQ abdominal pain. Drinks 1/2 bottle of vodka daily. Last consumption on 01/27 at 3:45 pm. Hemodynamically stable except for tachycardia. Hb stable 12 (baseline 12.9). Differential diagnosis include upper GI bleeding 2/2 possible esophageal varices (not prior EGD), Martine-Garcia, PUD, Dieulafoy's lesion. His imaging is notable for CT positive for large gastroesophageal varices on 01/27.    - Avoid NSAIDs, ACO or aspirin   - GI hepatology consulted, appreciate assistance    - PPI IV BID    - PLanning for scope during this admission once more stable from withdrawal standpoint     - Ceftriaxone   - ADAT, will need to be NPO prior to any scope     Acute hepatitis, likely alcohol-related  Acute decompensated cirrhosis  Complicated by esophageal varices, actively bleeding  Coagulopathy   Portal hypertension  Hypoalbuminemia  Ascites  Thrombocytopenia  Mild anemia  ABUS without sign of clot. Persistent RUQ pain, chronic fatigue/malaise/jaundice associated with jaundice and elevated direct bilirubin/AST/ALT/INR compatible with acute hepatitis. Etiology most likely alcoholic hepatitis, AST/ALT>2.  Has negative viral serologies, HIV, RPR, ceruloplasmin and urine copper on 05/12.   - Hepatology following   - Daily MELD labs     MELD-Na score: 19 at 1/26/2022  5:39 PM  MELD score: 19 at 1/26/2022   5:39 PM  Calculated from:  Serum Creatinine: 0.53 mg/dL (Using min of 1 mg/dL) at 1/26/2022  5:39 PM  Serum Sodium: 140 mmol/L (Using max of 137 mmol/L) at 1/26/2022  5:39 PM  Total Bilirubin: 8.5 mg/dL at 1/26/2022  5:39 PM  INR(ratio): 1.53 at 1/26/2022  5:39 PM  Age: 26 years      Alcohol use disorder  Concern for alcohol withdrawal  Has history of withdrawal seizures - although intermittently denies. No history of DT or alcoholic hallucinations. Drinks 1/2 bottle of vodka since age 17 daily. Last drink was 200cc coffee liquor 1549 on 01/26. Tachycardic, tremor. Has been drinking since the age of 19. Had a period of sobriety for 6 months in 2018, but since then drinks up yo 1L vodka. Last drink on 01/26 3:45 pm. Has been drinking 1/2 bottle of vodka on a daily basis.   - Addiction consulted, appreciate assistance.   - Gabapentin 600mg TID  -  CIWA protocol with Lorazepam for elevated LFTs   - Telemetry     Recovered COVID-19 infection  Asymptomatic. COVID-19 positive on 1/7 per him and SO - via home test. Cycle threshold 35.3. Discussed with infection prevention and infectious disease. Mildly elevated d-dimer, no hypoxia.      Schizophrenia  Not following any mental health provider. Has a history of auditory and visual hallucinations. Currently without active hallucinations or active SI, no desire to harmhimself or others at any time. Not interested in taking antipsychotic medications   - Consider Mental Health consult     Polyarthritis   Psoriasis  Chronic patches of dry, scaled skin to shins bilaterally. Healing patches to hands, bilaterally. Patient reports relief from Eucerin and some steroid cream however feels he will not need the steroid cream for now.    - Eucerin PRN       Diet: NPO for Medical/Clinical Reasons Except for: Meds    DVT Prophylaxis: Low Risk/Ambulatory with no VTE prophylaxis indicated  Gusman Catheter: Not present  Central Lines: None  Cardiac Monitoring: None  Code Status: Full Code       Disposition Plan   Expected Discharge: 02/02/2022  Anticipated discharge location:  Awaiting care coordination huddle  Delays:           The patient's care was discussed with the Bedside Nurse, Care Coordinator/, Patient, Patient's Family and GI Consultant.    Dora Bush MD  Hospitalist Service, GOLD TEAM 11  [facility]  Securely message with the Vocera Web Console (learn more here)  Text page via [facility] Paging/Directory   Please see signed in provider for up to date coverage information      Clinically Significant Risk Factors Present on Admission             # Coagulation Defect: INR = 1.53 (Ref range: 0.85 - 1.15) and/or PTT = 39 Seconds (Ref range: 22 - 38 Seconds) on admission, will monitor for bleeding  # Thrombocytopenia: Plts = 94 10e3/uL (Ref range: 150 - 450 10e3/uL) on admission, will monitor for bleeding   # Overweight: last Body mass index is 27.4 kg/m .          ______________________________________________________________________    Interval History   Admitted overnight for hematemesis in the setting of ETOH use. Is feeling better this AM and with no further bloody vomit and no bloody stools. Is quite shaking. Denies hallucinations with withdrawals. Updated girlfriend, Alexa.     Data reviewed today: I reviewed all medications, new labs and imaging results over the last 24 hours. Labs and Imaging reviewed in Epic, pertinent discussed in A&P.       Physical Exam   Vital Signs: Temp: 98.1  F (36.7  C) Temp src: Oral BP: 139/88 Pulse: (!) 123   Resp: 17 SpO2: 97 % O2 Device: None (Room air)    Weight: 202 lbs 0 oz    Gen: NAD, alert, pleasant, cooperative, non-toxic, quite tremulous  HEENT: EOMI, present conjunctival icterus, tracking appropriately  Resp: CTAB, no crackles or wheezes, no increased WOB  Cardiac: RRR, no S3/S4, no M/R/G appreciated  GI: soft, non-tender, non-distended  Ext: WWP, no edema, spontaneous movement in all  4  Neuro: AOx3, CN 2-12 grossly intact, appropriate mentation

## 2022-01-27 NOTE — CONSULTS
"[facility]   Consult Note - Addiction Service     Date of Admission:  1/26/2022    Consult Requested by: Dr. Efrain Westbrook  Reason for Consult: \"patient with significant alcohol use disorder admitted for alcohol withdrawal\"     Assessment & Plan   The patient has a PMHx that include alcohol use disorder, hx of withdrawal seizure, schizophrenia and hallucinations (not on medications), arthritic psoriasis, HTN and recent hospitalization on 05/13/21 for alcohol hepatitis who is admitted on 1/26/2022 for hematemesis.     # Severe Alcohol use disorder  # Alcohol withdrawal   # Acute decompensated cirrhosis  # Complicated by esophageal varices, actively bleeding  # Coagulopathy  # Portal hypertension  # Hypoalbuminemia  # Ascites  # Thrombocytopenia  # Mild anemia  # Uninsured   Patient with known alcohol use disorder, reviewed recent FP visits with Donald Vallejo PA-C. Patient notes he is scared to stop drinking outpatient due to history of difficult withdrawals. He notes he knows he is dependent on alcohol. He states he is interested in stopping all alcohol use. He is not interested on this initial meeting in further inpatient or extensive outpatient programs regarding his alcohol use. He currently is going through alcohol withdrawal. Our team will continue to build rapport with him throughout his hospitalization.   - For alcohol withdrawal: Continue CIWA;  consider adding on gabapentin, 600 mg TID.  Consider prescribing at discharge if this helps with underlying anxiety.  -We discussed different options to maintain goal of sobriety, including medications to manage cravings, including acamprosate and naltrexone. Will continue to have conversations around these medications.  Recommend initiating naltrexone, 50 mg daily, and prescribing at discharge.  - Notable barriers to care include: Lack of insurance, difficulty with transportation to medical appointments     # " Schizophrenia   Patient with known history of Schizophrenia. He has previously declined medications, referrals etc.   - Will continue to explore his interest in other mental support while he is here      # Peer Support:   -Our per  will meet the patient if agreeable and still hospitalized on next Thursday, to provide additional outpatient resources  -To contact Bhavya, Peer  from [facility] ([facility]): call or text: [phone]    # Addiction Social Worker:   Our addiction social worker John Leavitt can be contacted on her pager [phone] or texted/called at [phone]. Will continue to discuss and explore ways we can support this patient.     # Linkage to Care:   Patient not insured. Considering connection to care through [facility].   Patient to follow up with Dr. Lamonte Johnson at:  [facility] ([facility])  [address] [address]  Main number: [phone]         The patient's care was discussed with the Attending Physician, Dr. Johnson and Patient.    I spent 120 minutes on the unit/floor managing the care of Tulio Rosales. Over 50% of my time was spent on the following:   - Counseling the patient and/or family regarding: diagnostic results, prognosis, risks and benefits of treatment options and recommended follow-up  - Coordination of care with the: consultant(s) and care coordinator/    Cornelia Mejia DO  Family Medicine PGY-2  Pronouns: She/Hers  [facility]   Contact information available via [facility] Paging/Directory  Please see sign in/sign out for up to date coverage information    ChAT team (Addiction Consult Team): Coverage Monday-Friday 8-4pm    Provider (Pager)  (Pager)   Mon Dr. Lamonte Johnson [phone] John Leavitt [phone]   Tues Dr. Lamonte Johnson [phone] John [address]avitt [phone]   Wed Dr. Lamonte Johnson [phone] None   Thurs Dr. Han Nichols [phone]avitt [phone]   Fri Dr. Selwyn Tavarez [phone]  "John Leavitt [phone]   [facility] Psych Team- Refer to [facility].  For urgent needs, please place a  consult for psychiatry. None     ______________________________________________________________________    Chief Complaint   Hematemesis     History is obtained from the patient    History of Present Illness   Tulio Rosales has a PMHx that include alcohol use disorder, hx of withdrawal seizure, schizophrenia and hallucinations (not on medications), arthritic psoriasis, HTN and recent hospitalization on 05/13/21 for alcohol hepatitis who is admitted on 1/26/2022 for hematemesis.     He denies other drug use including IV drug use and tobacco use. He has been drinking alcohol since the age of 19 years old. He states it started as a more social thing and then he started drinking more alone. He drinks \"a lot of Sake\" as well as Vodka, at least 7-8 shots a day. His last drink was around 4 pm yesterday 1/26. He reports he was sober for around 8 months at the longest period, can't remember what year 2020 or 2021? Per chart review another physician noted longest period of sobriety  6 months in 2018. He states he has tried inpatient alcohol detoxification before, but can't remember the year. He notes he drinks because he is bored and he is afraid of withdrawal. This past month he realized that his drinking had gotten \"really bad.\" When asked if he considered stopping, pt states he wants to stop completely. Motivations to stopping include being there for his fiancee and being scared about his health since this hospital admission. Patient is interested in medical assistance to reduce alcohol cravings. Is not interested in this time in outpatient or inpatient alcohol specific programming. States \"I know how to get that set up if I want.\"     Withdrawal history: He has been scared of stopping due to \"bad\" withdrawals, denies previous seizures, notes he has had some delusions before when withdrawing     History of long-term or custodial? 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   Housing status: Lives in an apartment in [place]   HIV status: Negative 5/13/2021   Hep C status: Nonreactive 5/12/2021   Hep A status: Nonimmune  5/12/2021  Hep B status: Nonimmune 5/12/2021   No primary care provider currently, used to see ARCELIA Vallejo at [facility]  Not insured    Addiction Consult team Handoff:  Drug of choice: Alcohol   [facility]  transportation status: Signficant trouble with transportation, him and his fiancee do not drive, hard to get to medical appointments, often asking for rides or Uber   significant cultural identities:    medical issues:  Alcohol use disorder, Alcohol withdrawal, Acute decompensated cirrhosis, Complicated by esophageal varices, actively bleeding, Coagulopathy, Portal hypertension, Hypoalbuminemia, Ascites,Thrombocytopenia, Mild anemia  Peer Involvement: Once medically feeling better, would suggest linkage to Bhavya Peer    Insurance: Pt believes he is NOT insured   What discharge meds are needed, have they been ordered, who is outpatient provider to follow up?: Continued discussions regarding linkage to care, possibly [facility]. If has insurance maybe able to re-see previous primary care provider at [facility]. May need Naltrexone prescription for discharge.   To do list/goals for the team: Continue building rapport, potentially talk to Bhavya when medically feeling better, discuss Naltrexone more in-depth, establish who following up with afterwards ?[facility], assistance with insurance, assistance with transportation to medical appointments, discuss mental health more in-depth    Review of Systems   The 10 point Review of Systems is negative other than noted in the HPI or here.     Past Medical History:   Diagnosis  Date     Admitted to substance misuse detoxification center      Polyarthritis of upper arm 09/18/2019    L UE       Past Surgical History:   Procedure Laterality Date     NO HISTORY OF SURGERY       Social History   Social History     Socioeconomic History     Marital status: Single     Spouse name: Not on file     Number of children: 0     Years of education: Not on file     Highest education level: Not on file   Occupational History     Occupation: [employer]   Tobacco Use     Smoking status: Former Smoker     Packs/day: 0.00     Smokeless tobacco: Never Used   Substance and Sexual Activity     Alcohol use: Yes     Comment: 10-12 ounces a day of 80 proof vodka     Drug use: Never     Sexual activity: Yes     Partners: Female   Other Topics Concern     Not on file   Social History Narrative     Not on file     Social Determinants of Health     Financial Resource Strain: Not on file   Food Insecurity: Not on file   Transportation Needs: Not on file   Physical Activity: Not on file   Stress: Not on file   Social Connections: Not on file   Intimate Partner Violence: Not on file   Housing Stability: Not on file       Family History   I have reviewed this patient's family history and updated it with pertinent information if needed.  Family History   Problem Relation Age of Onset     Diabetes Mother      Diabetes Brother      Arthritis Brother         Rheumatoid     Medications   I have reviewed this patient's current medications    Allergies   No Known Allergies    Physical Exam   Temp: 98  F (36.7  C) Temp src: Oral BP: (!) 141/85 Pulse: 105   Resp: 18 SpO2: 98 % O2 Device: None (Room air)      Gen: no acute distress, well nourished, well developed, diaphoretic   HEENT: NC/AT, MMM, EOMI,   CV: Extremities WWP, pulses assumed   CV: No cyanosis or pallor, warm and well perfused.  Respiratory: No respiratory distress, no accessory muscle use.  Extremities: Warm, able to move all four extremities at will.  Skin: No erythema, no  "lesions or rashes.   Neuro: Notable tremor.   Psych: Appropriate appearance, cooperative, normal eye contact, speech non pressure conversant, oriented to person place time and situation. Mood \"down\" denies SI or HI. Affect congruent. Thought process clear. Denies visual or auditory hallucinations.     Due to regulation of Title 42 of the Code of Federal Regulations (CFR) Part 2: Confidentiality laws apply to this note and the information wherein.  Thus, this note cannot be copy and pasted into any other health care staff's note nor can it be included in general medical records sent to ANY outside agency without the patient's written consent.    Addiction Medicine Staff Addendum  Date of Service: 1/27/2022  I have seen and examined Tulio RADHA HobsonBobby with the resident team, reviewed the data and discussed the plan of care with the patient and the care team on P&FC Rounds.  I agree with the above documentation     I discussed pt's care with bedside RN, case management/social work today.  I personally reviewed labs, medications and past 24 hr notes.  Assessment/Plan/Diagnoses: plan/dx as above, which contains my edits and reflects our joint medical decision-making.     Lamonte Johnson MD  Addiction Medicine   of Internal Medicine and Pediatrics  [facility]  Pager: [phone]      "

## 2022-01-28 VITALS
WEIGHT: 202 LBS | DIASTOLIC BLOOD PRESSURE: 96 MMHG | HEIGHT: 72 IN | SYSTOLIC BLOOD PRESSURE: 142 MMHG | BODY MASS INDEX: 27.36 KG/M2 | RESPIRATION RATE: 16 BRPM | TEMPERATURE: 98.4 F | OXYGEN SATURATION: 99 % | HEART RATE: 108 BPM

## 2022-01-28 LAB
ALBUMIN SERPL-MCNC: 2.5 G/DL (ref 3.4–5)
ALP SERPL-CCNC: 95 U/L (ref 40–150)
ALT SERPL W P-5'-P-CCNC: 63 U/L (ref 0–70)
ANION GAP SERPL CALCULATED.3IONS-SCNC: 5 MMOL/L (ref 3–14)
AST SERPL W P-5'-P-CCNC: 134 U/L (ref 0–45)
BILIRUB SERPL-MCNC: 8.5 MG/DL (ref 0.2–1.3)
BUN SERPL-MCNC: 10 MG/DL (ref 7–30)
CALCIUM SERPL-MCNC: 7.9 MG/DL (ref 8.5–10.1)
CHLORIDE BLD-SCNC: 107 MMOL/L (ref 94–109)
CO2 SERPL-SCNC: 25 MMOL/L (ref 20–32)
CREAT SERPL-MCNC: 0.55 MG/DL (ref 0.66–1.25)
ERYTHROCYTE [DISTWIDTH] IN BLOOD BY AUTOMATED COUNT: 22.8 % (ref 10–15)
GFR SERPL CREATININE-BSD FRML MDRD: >90 ML/MIN/1.73M2
GLUCOSE BLD-MCNC: 79 MG/DL (ref 70–99)
HCT VFR BLD AUTO: 28.9 % (ref 40–53)
HGB BLD-MCNC: 9.4 G/DL (ref 13.3–17.7)
HGB BLD-MCNC: 9.6 G/DL (ref 13.3–17.7)
HOLD SPECIMEN: NORMAL
INR PPP: 1.53 (ref 0.85–1.15)
MCH RBC QN AUTO: 28.7 PG (ref 26.5–33)
MCHC RBC AUTO-ENTMCNC: 32.5 G/DL (ref 31.5–36.5)
MCV RBC AUTO: 88 FL (ref 78–100)
PLATELET # BLD AUTO: 76 10E3/UL (ref 150–450)
POTASSIUM BLD-SCNC: 3.5 MMOL/L (ref 3.4–5.3)
PROT SERPL-MCNC: 6.2 G/DL (ref 6.8–8.8)
RBC # BLD AUTO: 3.27 10E6/UL (ref 4.4–5.9)
SODIUM SERPL-SCNC: 137 MMOL/L (ref 133–144)
WBC # BLD AUTO: 6.4 10E3/UL (ref 4–11)

## 2022-01-28 PROCEDURE — 80053 COMPREHEN METABOLIC PANEL: CPT | Performed by: STUDENT IN AN ORGANIZED HEALTH CARE EDUCATION/TRAINING PROGRAM

## 2022-01-28 PROCEDURE — 96376 TX/PRO/DX INJ SAME DRUG ADON: CPT | Performed by: EMERGENCY MEDICINE

## 2022-01-28 PROCEDURE — 99207 PR APP CREDIT; MD BILLING SHARED VISIT: CPT | Performed by: INTERNAL MEDICINE

## 2022-01-28 PROCEDURE — C9113 INJ PANTOPRAZOLE SODIUM, VIA: HCPCS | Performed by: STUDENT IN AN ORGANIZED HEALTH CARE EDUCATION/TRAINING PROGRAM

## 2022-01-28 PROCEDURE — 250N000011 HC RX IP 250 OP 636: Performed by: STUDENT IN AN ORGANIZED HEALTH CARE EDUCATION/TRAINING PROGRAM

## 2022-01-28 PROCEDURE — 36415 COLL VENOUS BLD VENIPUNCTURE: CPT | Performed by: STUDENT IN AN ORGANIZED HEALTH CARE EDUCATION/TRAINING PROGRAM

## 2022-01-28 PROCEDURE — 85018 HEMOGLOBIN: CPT | Performed by: STUDENT IN AN ORGANIZED HEALTH CARE EDUCATION/TRAINING PROGRAM

## 2022-01-28 PROCEDURE — 96366 THER/PROPH/DIAG IV INF ADDON: CPT | Performed by: EMERGENCY MEDICINE

## 2022-01-28 PROCEDURE — 99232 SBSQ HOSP IP/OBS MODERATE 35: CPT | Mod: GC | Performed by: INTERNAL MEDICINE

## 2022-01-28 PROCEDURE — 85027 COMPLETE CBC AUTOMATED: CPT | Performed by: STUDENT IN AN ORGANIZED HEALTH CARE EDUCATION/TRAINING PROGRAM

## 2022-01-28 PROCEDURE — 250N000013 HC RX MED GY IP 250 OP 250 PS 637: Performed by: STUDENT IN AN ORGANIZED HEALTH CARE EDUCATION/TRAINING PROGRAM

## 2022-01-28 PROCEDURE — 120N000001 HC R&B MED SURG/OB

## 2022-01-28 PROCEDURE — 99233 SBSQ HOSP IP/OBS HIGH 50: CPT | Performed by: STUDENT IN AN ORGANIZED HEALTH CARE EDUCATION/TRAINING PROGRAM

## 2022-01-28 PROCEDURE — 85610 PROTHROMBIN TIME: CPT | Performed by: STUDENT IN AN ORGANIZED HEALTH CARE EDUCATION/TRAINING PROGRAM

## 2022-01-28 PROCEDURE — 250N000013 HC RX MED GY IP 250 OP 250 PS 637

## 2022-01-28 RX ORDER — NALTREXONE HYDROCHLORIDE 50 MG/1
50 TABLET, FILM COATED ORAL DAILY
Status: DISCONTINUED | OUTPATIENT
Start: 2022-01-28 | End: 2022-01-29 | Stop reason: HOSPADM

## 2022-01-28 RX ADMIN — LORAZEPAM 2 MG: 0.5 TABLET ORAL at 08:29

## 2022-01-28 RX ADMIN — GABAPENTIN 600 MG: 600 TABLET, FILM COATED ORAL at 21:29

## 2022-01-28 RX ADMIN — Medication 1 TABLET: at 08:29

## 2022-01-28 RX ADMIN — LORAZEPAM 2 MG: 0.5 TABLET ORAL at 00:14

## 2022-01-28 RX ADMIN — FOLIC ACID 1 MG: 1 TABLET ORAL at 08:29

## 2022-01-28 RX ADMIN — GABAPENTIN 600 MG: 600 TABLET, FILM COATED ORAL at 14:03

## 2022-01-28 RX ADMIN — CEFTRIAXONE SODIUM 2 G: 2 INJECTION, POWDER, FOR SOLUTION INTRAMUSCULAR; INTRAVENOUS at 14:06

## 2022-01-28 RX ADMIN — PANTOPRAZOLE SODIUM 40 MG: 40 INJECTION, POWDER, FOR SOLUTION INTRAVENOUS at 08:29

## 2022-01-28 RX ADMIN — NALTREXONE HYDROCHLORIDE 50 MG: 50 TABLET, FILM COATED ORAL at 21:29

## 2022-01-28 RX ADMIN — THIAMINE HCL TAB 100 MG 100 MG: 100 TAB at 08:29

## 2022-01-28 RX ADMIN — LORAZEPAM 1 MG: 0.5 TABLET ORAL at 21:35

## 2022-01-28 RX ADMIN — PANTOPRAZOLE SODIUM 40 MG: 40 INJECTION, POWDER, FOR SOLUTION INTRAVENOUS at 21:38

## 2022-01-28 RX ADMIN — GABAPENTIN 600 MG: 600 TABLET, FILM COATED ORAL at 08:29

## 2022-01-28 ASSESSMENT — ACTIVITIES OF DAILY LIVING (ADL)
ADLS_ACUITY_SCORE: 9
ADLS_ACUITY_SCORE: 8
ADLS_ACUITY_SCORE: 9
ADLS_ACUITY_SCORE: 8
ADLS_ACUITY_SCORE: 9
ADLS_ACUITY_SCORE: 8
ADLS_ACUITY_SCORE: 8
ADLS_ACUITY_SCORE: 9
ADLS_ACUITY_SCORE: 8
ADLS_ACUITY_SCORE: 9
ADLS_ACUITY_SCORE: 8
ADLS_ACUITY_SCORE: 9
ADLS_ACUITY_SCORE: 8
ADLS_ACUITY_SCORE: 9
ADLS_ACUITY_SCORE: 8

## 2022-01-28 NOTE — PROGRESS NOTES
[facility]     Addiction Progress Note - Addiction Service        Date of Admission:  1/26/2022  Assessment & Plan       The patient has a PMHx that include alcohol use disorder, hx of withdrawal seizure, schizophrenia and hallucinations (not on medications), arthritic psoriasis, HTN and recent hospitalization on 05/13/21 for alcohol hepatitis who is admitted on 1/26/2022 for hematemesis.      # Severe Alcohol use disorder  # Alcohol withdrawal   # Acute decompensated cirrhosis  - For alcohol withdrawal: Continue CIWA  - continue gabapentin, 600 mg TID.  Consider prescribing at discharge if this helps with underlying anxiety.  -We discussed different options to maintain goal of sobriety, including medications to manage cravings, including acamprosate and naltrexone. Will continue to have conversations around these medications.  Recommend initiating naltrexone, 50 mg daily, and prescribing at discharge.  - Notable barriers to care include: Lack of insurance, difficulty with transportation to medical appointments      # Schizophrenia   Patient with known history of Schizophrenia. He has previously declined medications, referrals etc.   - Will continue to explore his interest in other mental support while he is here      # Peer Support:   -Our per  will meet the patient if agreeable and still hospitalized on next Thursday, to provide additional outpatient resources  -To contact Bhavya Peer  from [facility] ([facility]): call or text: [phone]     # Addiction Social Worker:   Our addiction social worker John Leavitt can be contacted on her pager [phone] or texted/called at [phone]. Will continue to discuss and explore ways we can support this patient.      # Linkage to Care:   Patient not insured. Considering connection to care through [facility].   Patient to follow up with Dr. Lamonte Johnson at:  [facility]  ([facility])  [address] [address]  Main number: [phone]    His Girlfriend, Alexa Chery, is also interested in follow up; her number is [phone], will work to register both at [facility]              The patient's care was discussed with the Bedside Nurse, Patient, Patient's Family and Primary team.      Lamonte Johnson MD  Addiction Service   [facility]   [phone]  Contact information available via [facility] Paging/Directory    ChAT team (Addiction Consult Team): Coverage Monday-Friday 8-4pm    Provider (Pager)  (Pager)   Mon Dr. Lamonte Johnson [phone] John Leav[phone]   Tues Dr. Lamonte Johnson [phone]   Wed Dr. Lamonte Johnson [phone] None   Thurs Dr. Han Nichols [phone] John Leav[phone]   Fri Dr. Selwyn Taavrez [phone] [phone]   Sat-[place] Psych Team- Refer to [facility].  For urgent needs, please place a  consult for psychiatry. None     ______________________________________________________________________    Interval History   Still withdrawing, requiring benzos. Will work to register at [facility], will have him sign paperwork tomorrow.           Data reviewed today: I reviewed all medications, new labs and imaging results over the last 24 hours.    Physical Exam   Vital Signs: Temp: 99.5  F (37.5  C) Temp src: Oral BP: 94/63 Pulse: 108   Resp: 16 SpO2: 96 % O2 Device: None (Room air)    Weight: 185 lbs 6.51 oz  Gen: NAD, sleeping  HEENT: EOMI, PERRL, MMM  CV: extremities warm and well perfused  Resp: breathing comfortably on RA  : deferred  Msk: no LE edema  Skin: no rashes  Neuro: nonfocal exam        Due to regulation of Title 42 of the Code of Federal Regulations (CFR) Part 2: Confidentiality laws apply to this note and the information wherein.  Thus, this note cannot be copy and pasted into any other health care staff's note nor can it be included in general medical records sent to ANY outside agency without the patient's written consent.

## 2022-01-28 NOTE — PROGRESS NOTES
"I was notified that Mr. Rosales was agitated and attempting to leave the hospital and forego current monitoring and tomorrow's planned colonoscopy.    At the bedside he was seated in the chair having just received lorazepam. He told me he wanted to leave the hospital, but when asked could not describe the risk of leaving. He said he was having \"DT's\" currently but would be safe going home. In the middle of conversation he began to walk out saying \"oh I thought you were letting me go,\" which did not correspond to our conversation. He denied hallucinations.    BP (!) 151/80   Pulse 110   Temp 98.9  F (37.2  C) (Oral)   Resp 18   Ht 1.829 m (6')   Wt 91.6 kg (202 lb)   SpO2 99%   BMI 27.40 kg/m    On exam he is calm, tremulous, tachycardic, with horizontal nystagmus. Speech is slurred and at times inaudible. He is oriented x3, awake but poorly alert. Not obviously responding to internal stimuli or hallucination. Unsteady gait; attempts to stand and falls back into chair.    My assessment is that he is experiencing alcohol withdrawal, and is unable to make an informed decision to leave the hospital due to delirium related to substance use. Our conversation revealed, for the moment, inability to recognize his current circumstances. In addition to being \"unsafe\" to leave due to impaired sensorium, gait, and cognition, I do not think he has decision making capacity for this particular decision. There is a lower threshold to refuse treatments however, and in his current state he would be able to refuse endoscopy, for example.    For now will continue with CIWA/lorazepam, with beside attendant. Would place a hold if attempting to leave. Discussed with bedside and ED nurses.    Rd Mitchell, DO    "

## 2022-01-28 NOTE — PROGRESS NOTES
[facility]    Physician Attestation   I, Dora Bush, was present with the medical/KENYETTA student who participated in the service and in the documentation of the note.  I have verified the history and personally performed the physical exam and medical decision making.  I agree with the assessment and plan of care as documented in the note.      I personally reviewed vital signs, medications, labs and imaging.    Wanting to leave AMA this AM. Discussed this with BLADIMIR Liu, who encouraged patient to stay and have his hgb monitored. He is quite shaky and clearly actively withdrawing - is doing much better later in the day when seen again. He is agreeable to staying tonight to monitor hgb and possibly get scoped if needed, although I think it'll be challenging to convince him to stay the entire weekend.     I do not think he is holdable as he is able to explain risks of leaving including the risk of death in the setting of his withdrawal and GI bleed. Will continue to work with addiction service and GI team.     Dora Bush MD  Date of Service (when I saw the patient): 01/28/22      Medicine Progress Note - Hospitalist Service, GOLD TEAM 11    Date of Admission:  1/26/2022    Assessment & Plan         Tulio Rosales is a 26 year old male admitted on 1/26/2022. His PMHx include alcohol use disorder (last use ), history of withdrawal seizure, schizophrenia and hallucinations (not on medications), arthritic psoriasis, HTN and recent hospitalization on 05/13/21 for alcohol hepatitis who is admitted on 11/26 for hematemesis.     Changes today:  - No scope today given active DTs and no evidence of active GI bleed. They continue to follow   - Discussed gravity of condition with pt and his GF with emphasis that staying in the hospital to monitor Hgb versus going home      Acute GI bleeding, hematochezia/melena  Esophageal bleeding  Pt with several days PTA of hematochezia /  melena with worsening fatigue of RUQ abd pain. Reportedly drinks 1/2 bottle of vodka daily (last on 01/27 ~1600). HD   stable except for tachycardia. Hb stable down trending (12--> 9.4). Ddx include upper GI bleeding 2/2 possible esophageal varices (not prior EGD), Martine-Garcia, PUD, Dieulafoy's lesion. His imaging is notable for CT positive for large gastroesophageal varices on 01/27.    - Avoid NSAIDs, ACO or aspirin   - GI hepatology consulted, appreciate assistance    - PPI IV BID    - PLanning for scope during this admission once more stable from withdrawal standpoint     - Ceftriaxone   - ADAT, will need to be NPO prior to any scope     Acute hepatitis, likely alcohol-related  Acute decompensated cirrhosis  Complicated by esophageal varices, actively bleeding  Coagulopathy   Portal hypertension  Hypoalbuminemia  Ascites  Thrombocytopenia  Mild anemia  ABUS without sign of clot. Persistent RUQ pain, chronic fatigue/malaise/jaundice associated with jaundice and elevated direct bilirubin/AST/ALT/INR compatible with acute hepatitis. Etiology most likely alcoholic hepatitis, AST/ALT>2. Viral serologies, HIV, RPR, ceruloplasmin and urine copper negative on 05/12.   - Hepatology following   - Daily MELD labs   - Monitoring withdrawal      MELD-Na score: 19 at 1/28/2022  6:31 AM  MELD score: 19 at 1/28/2022  6:31 AM  Calculated from:  Serum Creatinine: 0.55 mg/dL (Using min of 1 mg/dL) at 1/28/2022  6:31 AM  Serum Sodium: 137 mmol/L at 1/28/2022  6:31 AM  Total Bilirubin: 8.5 mg/dL at 1/28/2022  6:31 AM  INR(ratio): 1.53 at 1/28/2022  6:31 AM  Age: 26 years     Alcohol use disorder  Concern for alcohol withdrawal  Has history of withdrawal seizures - although intermittently denies. No history of DT or alcoholic hallucinations. Drinks 1/2 bottle of vodka since age 17 daily. Last drink was 200cc coffee liquor 1549 on 01/26. Tachycardic, tremor. Had a period of sobriety for 6 months in 2018, but since then drinks up yo  1L vodka. Last drink on 01/26 3:45 pm. Has been drinking 1/2 bottle of vodka on a daily basis. Persistently tremulous.    - Addiction consulted, appreciate assistance.   - Gabapentin 600mg TID  -  CIWA protocol with Lorazepam for elevated LFTs   - Telemetry     Recovered COVID-19 infection  Asymptomatic. COVID-19 positive on 1/7 per him and SO - via home test. Cycle threshold 35.3. Discussed with infection prevention and infectious disease. Mildly elevated d-dimer, no hypoxia. No need for infectious percautions      Schizophrenia  Not following any mental health provider. Has a history of auditory and visual hallucinations. Currently without active hallucinations or active SI, no desire to harm himself or others at any time. Not interested in taking antipsychotic medications   - Consider Mental Health consult when DT resolved      Polyarthritis   Psoriasis  Chronic patches of dry, scaled skin to shins bilaterally. Healing patches to hands, bilaterally. Patient reports relief from Eucerin and some steroid cream however feels he will not need the steroid cream for now.    - Eucerin PRN       Diet: Regular Diet Adult    DVT Prophylaxis: Low Risk/Ambulatory with no VTE prophylaxis indicated  Gusman Catheter: Not present  Central Lines: None  Cardiac Monitoring: None  Code Status: Full Code      Disposition Plan   Expected Discharge: 02/02/2022  Anticipated discharge location:  Awaiting care coordination huddle  Delays:           The patient's care was discussed with the Bedside Nurse, Care Coordinator/, Patient, Patient's Family and GI Consultant.    Jessie Hastings  Hospitalist Service, GOLD TEAM [address] [place] [address]  Securely message with the Vocera Web Console (learn more here)  Text page via [employer] Paging/Directory   Please see signed in provider for up to date coverage information      Clinically Significant Risk Factors Present on Admission              #  Overweight: last Body mass index is 27.4 kg/m .          ______________________________________________________________________    Interval History   Pt wanting to leave overnight stating that provider said he could leave. Emphasizes that he does not want to be in the hospital. Knows he is here voluntarily and if he leaves he could very well die. Really would like to eat as he is quite hungry, notes he has gone through DTs before and is always fine, says he has never had a withdrawal seizure. Was much more agreeable to leaving once told he could eat.     No pain, no SOB, no difficulty breathing. 4 point ROS otherwise negative.'    Data reviewed today: I reviewed all medications, new labs and imaging results over the last 24 hours. Labs and Imaging reviewed in Epic, pertinent discussed in A&P.       Physical Exam   Vital Signs: Temp: 98.4  F (36.9  C) Temp src: Oral BP: 127/70 Pulse: 95   Resp: 18 SpO2: 100 % O2 Device: None (Room air)    Weight: 202 lbs 0 oz     Gen: NAD, alert, inattentive - quite agitated in the AM more calm in the afternoon  HEENT: EOMI, no conjunctival icterus, tracking appropriately  Resp: no increased WOB, no wheezing  GI: non-distended  Ext: WWP, no edema, spontaneous movement in all 4  Neuro: AOx3, CN 2-12 grossly intact, appropriate mentation  Psych: disengaged, slow thought process.

## 2022-01-28 NOTE — PROGRESS NOTES
[facility]    Hepatology Follow-up    CC: Hematemesis    Dx: Alcoholic hepatitis, possible cirrhosis                 Upper GI Bleed                   24 hour events:   ADELINA  Subjective:  Confused, denies any pain    Medications  Current Facility-Administered Medications   Medication Dose Route Frequency     cefTRIAXone  2 g Intravenous Q24H     folic acid  1 mg Oral Daily     gabapentin  600 mg Oral TID     multivitamin w/minerals  1 tablet Oral Daily     pantoprazole (PROTONIX) IV  40 mg Intravenous BID     sodium chloride (PF)  3 mL Intracatheter Q8H     thiamine  100 mg Oral Daily       Review of systems  A 10-point review of systems was negative.    Examination  BP (!) 152/103   Pulse 110   Temp 98.9  F (37.2  C) (Oral)   Resp 18   Ht 1.829 m (6')   Wt 91.6 kg (202 lb)   SpO2 100%   BMI 27.40 kg/m    No intake or output data in the 24 hours ending 01/28/22 0825    Constitutional: confused  Eyes: Sclera icteric  Ears/nose/mouth/throat: mucus membranes dry, small abrasion upon lowe lip  Neck: supple  CV: 1+ edema  Respiratory: Unlabored breathing  Abd:  Nondistended, +bs, nontender  Skin:  jaundiced  Neuro: AAO x 1-2      Laboratory  Lab Results   Component Value Date     01/28/2022     05/13/2021    POTASSIUM 3.5 01/28/2022    POTASSIUM 3.6 05/13/2021    CHLORIDE 107 01/28/2022    CHLORIDE 105 05/13/2021    CO2 25 01/28/2022    CO2 24 05/13/2021    BUN 10 01/28/2022    BUN 4 05/13/2021    CR 0.55 01/28/2022    CR 0.43 05/13/2021       Lab Results   Component Value Date    BILITOTAL 8.5 01/28/2022    BILITOTAL 18.6 05/13/2021    ALT 63 01/28/2022    ALT 54 05/13/2021     01/28/2022     05/13/2021    ALKPHOS 95 01/28/2022    ALKPHOS 163 05/13/2021       Lab Results   Component Value Date    WBC 6.4 01/28/2022    WBC 6.7 05/13/2021    HGB 9.4 01/28/2022    HGB 12.9 05/13/2021    MCV 88 01/28/2022    MCV 97 05/13/2021    PLT 76 01/28/2022    PLT 97  05/13/2021       Lab Results   Component Value Date    INR 1.53 01/28/2022    INR 1.37 05/13/2021       MELD-Na score: 19 at 1/28/2022  6:31 AM  MELD score: 19 at 1/28/2022  6:31 AM  Calculated from:  Serum Creatinine: 0.55 mg/dL (Using min of 1 mg/dL) at 1/28/2022  6:31 AM  Serum Sodium: 137 mmol/L at 1/28/2022  6:31 AM  Total Bilirubin: 8.5 mg/dL at 1/28/2022  6:31 AM  INR(ratio): 1.53 at 1/28/2022  6:31 AM  Age: 26 years    Radiology  US ABD  IMPRESSION:   Findings associated with decompensated alcoholic cirrhosis including  hepatomegaly, hepatic steatosis, mild splenomegaly, retrograde flow  within the right and left portal veins and recanalized paraumbilical  vein.    Assessment  26 year old male with Hx of alcoholic hepatitis and AUD, schizophrenia, psoriasis, HTN who came with concern for coffee ground emesis.    Upper GI bleed  Patient does not has any signs of active GI bleeding.  His hemoglobin has been a very slight downtrend.  He is confused and undergoing withdrawal and not able to consent for the procedure.  His significant other is also admitted in the ED and undergoing withdrawal.  In this scenario, unless he is having active GI bleeding requiring urgent/emergent procedure, we will plan to do endoscopy once his confusion/withdrawal is resolved.    Alcoholic hepatitis  Possible alcoholic cirrhosis, MELD-Na 19  Patient has alcoholic hepatitis, liver enzymes are downtrending with uptrend in his bilirubin which is expected.  At this point he is not a candidate for steroid therapy due to the concern of recent bleeding.  There is concern that patient possibly has cirrhosis given the evidence of mild splenomegaly and gastroesophageal varices upon his abdominal imaging.  Patient needs repeat imaging and evaluation in about 6 months of sobriety to evaluate baseline liver function/cirrhosis.    Recommendations  --Continue to monitor hemoglobin and meld labs daily  --Continue antibiotic for course of 7  days  --Continue PPI IV/PO twice daily  --Considering EGD as an inpatient versus outpatient pending clinical condition  --Please inform the GI fellow on-call if having active GI bleeding  --CIWA protocol and alcohol withdrawal management as per primary team  --Continue gabapentin as recommended by addiction medicine, it will help him with cravings/anxiety  --Follow-up in hepatology clinic in 6 to 8 weeks      Thank you for involving us in this patient's care. Please do not hesitate to contact the GI service with any questions or concerns.      Pt care plan discussed with Dr. Leventhal, Hepatology staff physician.    This note was created with voice recognition software, and while reviewed for accuracy, typos may remain.    Socrates Lim MD  [facility] Hepatology Fellow  Pager: [phone]

## 2022-01-29 ASSESSMENT — ACTIVITIES OF DAILY LIVING (ADL)
ADLS_ACUITY_SCORE: 9

## 2022-01-29 NOTE — DISCHARGE SUMMARY
[facility]  Hospitalist Discharge Summary      Date of Admission:  1/26/2022  Date of Discharge:  1/29/2022  2:45 AM  Discharging Provider: Dora Bush MD  Discharge Service: Hospitalist Service, GOLD TEAM 11     Patient left AMA from the ED. Dr. Smiley attempted to persuade patient to stay. Was not holdable given improved mental status with improvement of withdrawal.     Discharge Diagnoses   ABLA 2/2 likely GIB  ETOH use c/b withdrawal     Follow-ups Needed After Discharge    - Follow up at [facility] for establishment of care with Dr. Lamonte Johnson    Unresulted Labs Ordered in the Past 30 Days of this Admission     Date and Time Order Name Status Description    1/26/2022 10:50 PM Blood Culture Peripheral Blood Preliminary     1/26/2022 10:50 PM Blood Culture Peripheral Blood Preliminary       These results will be followed up by hospitalist pool    Discharge Disposition   AMA  Condition at discharge: Guarded      Hospital Course     Tulio Rosales is a 26 year old male admitted on 1/26/2022. His PMHx include alcohol use disorder (last use ), history of withdrawal seizure, schizophrenia and hallucinations (not on medications), arthritic psoriasis, HTN and recent hospitalization on 05/13/21 for alcohol hepatitis who is admitted on 11/26 for hematemesis.      Acute GI bleeding, hematochezia/melena  Esophageal bleeding  Pt with several days PTA of hematochezia / melena with worsening fatigue of RUQ abd pain. Reportedly drinks 1/2 bottle of vodka daily (last on 01/27 ~1600). HD   stable except for tachycardia. Hb stable down trending (12--> 9.4). Ddx include upper GI bleeding 2/2 possible esophageal varices (not prior EGD), Martine-Garcia, PUD, Dieulafoy's lesion. His imaging is notable for CT positive for large gastroesophageal varices on 01/27.  GI consulted, planning for scope once more stable from withdrawal standpoint. Hgb dropping prior to discharge, 9.6 was last  value. Left against medical advice.      Acute hepatitis, likely alcohol-related  Acute decompensated cirrhosis  Complicated by esophageal varices, actively bleeding  Coagulopathy   Portal hypertension  Hypoalbuminemia  Ascites  Thrombocytopenia  Mild anemia  ABUS without sign of clot. Persistent RUQ pain, chronic fatigue/malaise/jaundice associated with jaundice and elevated direct bilirubin/AST/ALT/INR compatible with acute hepatitis. Etiology most likely alcoholic hepatitis, AST/ALT>2. Viral serologies, HIV, RPR, ceruloplasmin and urine copper negative on 05/12. Hepatology following.      MELD-Na score: 19 at 1/28/2022  6:31 AM  MELD score: 19 at 1/28/2022  6:31 AM  Calculated from:  Serum Creatinine: 0.55 mg/dL (Using min of 1 mg/dL) at 1/28/2022  6:31 AM  Serum Sodium: 137 mmol/L at 1/28/2022  6:31 AM  Total Bilirubin: 8.5 mg/dL at 1/28/2022  6:31 AM  INR(ratio): 1.53 at 1/28/2022  6:31 AM  Age: 26 years     Alcohol use disorder  Concern for alcohol withdrawal  Has history of withdrawal seizures - although intermittently denies. No history of DT or alcoholic hallucinations. Drinks 1/2 bottle of vodka since age 17 daily. Last drink was 200cc coffee liquor 1549 on 01/26. Tachycardic, tremor. Had a period of sobriety for 6 months in 2018, but since then drinks up yo 1L vodka. Last drink on 01/26 3:45 pm. Has been drinking 1/2 bottle of vodka on a daily basis. Persistently tremulous. Was started on gabapentin and naltrexone. Seen by addiction medicine team with plan to follow up with Dr. Lamonte Johnson in clinic.      Recovered COVID-19 infection  Asymptomatic. COVID-19 positive on 1/7 per him and SO - via home test. Cycle threshold 35.3. Discussed with infection prevention and infectious disease. Mildly elevated d-dimer, no hypoxia. No need for infectious percautions      Schizophrenia  Not following any mental health provider. Has a history of auditory and visual hallucinations. Currently without active  hallucinations or active SI, no desire to harm himself or others at any time. Not interested in taking antipsychotic medications    Polyarthritis   Psoriasis  Chronic patches of dry, scaled skin to shins bilaterally. Healing patches to hands, bilaterally. Patient reports relief from Eucerin and some steroid cream however feels he will not need the steroid cream for now.    - Eucerin PRN    Consultations This Hospital Stay   ADDICTION SERVICE ADULT IP CONSULT FOR [place]  GI HEPATOLOGY ADULT IP CONSULT    Code Status   Prior    Time Spent on this Encounter   I, Dora Bush MD, discharged this patient today but I did not personally see the patient today and will not be billing for the patient's discharge.       Dora Bush MD  [facility] EMERGENCY DEPARTMENT  [address]  [place] [address]  Phone: [phone]  ______________________________________________________________________    Physical Exam   Vital Signs: Temp: 98.4  F (36.9  C) Temp src: Oral BP: (!) 142/96 Pulse: 108   Resp: 16 SpO2: 99 % O2 Device: None (Room air)    Weight: 202 lbs 0 oz    See physical  Exam from progress note of 1/28.        Primary Care Physician   LELAND SANDY    Discharge Orders   No discharge procedures on file.    Significant Results and Procedures   Results for orders placed or performed during the hospital encounter of 01/26/22   CTA Abdomen Pelvis with Contrast    Narrative    EXAMINATION: CTA ABDOMEN PELVIS WITH CONTRAST, 1/26/2022 8:19 PM    INDICATION: GI bleed    COMPARISON STUDY: None    TECHNIQUE: CT scan of the abdomen and pelvis was performed on  multidetector CT scanner using volumetric acquisition technique and  images were reconstructed in multiple planes with variable thickness  and reviewed on dedicated workstations. Per GI bleeding protocol the  abdomen and pelvis was scanned in noncontrast, arterial and delayed 80  second venous phase.    CONTRAST: iopamidol (ISOVUE-370) solution 124 mL  injected IV    CT scan radiation dose is optimized to minimum requisite dose using  automated dose modulation techniques.    FINDINGS:    Lower thorax: Normal.    Liver: Diffuse and heterogeneous hypoattenuation of the liver on  arterial and venous phase imaging with a hepatic contour..    Biliary System: Gallbladder is compressed with trace pericholecystic  fluid.    Pancreas: No mass or pancreatic ductal dilation.    Adrenal glands: No mass or nodules    Spleen: Normal.    Kidneys: No suspicious mass, obstructing calculus or hydronephrosis.    Gastrointestinal tract : Large gastroesophageal varices. Mild wall  thickening/edema surrounding the distal esophagus and stomach. Normal  caliber small bowel.  Visualized appendix is normal.    Mesentery/peritoneum/retroperitoneum: No mass. No free fluid or air.    Lymph nodes: Prominent portacaval and retroperitoneal lymph nodes. No  suspicious lymphadenopathy.    Vasculature: Recannulized umbilical vein. The major intra-abdominal  vasculature is patent. No evidence of extravasation of contrast  including endoluminal extravasation suggest a GI bleed.    Pelvis: Urinary bladder is normal.    Osseous structures: No aggressive or acute osseous lesion.  Mild  degenerative disc disease in the lower thoracic spine.      Soft tissues: Soft tissues density and stranding in the subcutaneous  tissues overlying the left groin (series 7 image [id number]).      Impression    IMPRESSION:   1.  No evidence of contrast extravasation to suggest acute GI  hemorrhage.  2. Cirrhotic configuration of the liver, given its heterogeneous  enhancement, superimposed acute hepatitis is not excluded. Underlying  fatty liver.  3. Features suggestive of portal venous hypertension including  recanalized umbilical vein, enlarged portal vein, and multiple large  gastroesophageal varices.  4. Mild gastroesophageal wall thickening and edema, reactive vs  gastroenteritis.  5. Soft tissue density in the subcutaneous  tissues of the left groin,  correlate with medication injection site or bruising. Developing  phlegmon/abscess is not excluded.    I have personally reviewed the examination and initial interpretation  and I agree with the findings.    DINO NASSAR MD         SYSTEM ID:  [id number]   XR Chest Port 1 View    Narrative    EXAM: XR CHEST PORT 1 VIEW  LOCATION: [facility]  DATE/TIME: 1/27/2022 3:59 AM    INDICATION: Patient with COVID 19, concerning for COVID 19 PNA  COMPARISON: None.      Impression    IMPRESSION: Negative chest.   US Abdomen Complete w Doppler Complete    Narrative    EXAMINATION: US ABDOMEN COMPLETE WITH DOPPLER COMPLETE  1/27/2022 8:17  AM      CLINICAL HISTORY: 25 yo w/ decompensated alcoholic cirrhosis    COMPARISON: 5/12/2021        FINDINGS:  The liver is enlarged measuring 21 cm in craniocaudal dimension with  increased echogenicity of the hepatic parenchyma.. There is no  intrahepatic or extrahepatic biliary ductal dilatation. The common  bile duct is not well-visualized on today's exam. The gallbladder is  normal, without gallstones, wall thickening, or pericholecystic fluid.    The spleen measures maximally 14.5 cm, mildly enlarged and is  otherwise normal in appearance. The pancreas is not well visualized on  today's exam.    Aorta is not well visualized on today's exam. The IVC measures 2.4 cm  in diameter. Recanalized paraumbilical vein is present.      Doppler exam with peak systolic velocities velocities as follows:  Extrahepatic portal vein flow is antegrade at 26 cm/s.  Right portal vein flow is retrograde, measuring 15 cm/s.  Left portal vein flow is retrograde, measuring 11 cm/s.    Flow in the hepatic artery is towards the liver and:  113 cm/s peak systolic  0.70 resistive index.     The kidneys are normal in position and echogenicity. The right kidney  measures 12.5 cm and the left kidney measures 12.7 cm. There is no  significant  urinary tract dilation. The urinary bladder is well  distended and normal in morphology. The bladder wall is normal.      Impression    IMPRESSION:   Findings associated with decompensated alcoholic cirrhosis including  hepatomegaly, hepatic steatosis, mild splenomegaly, retrograde flow  within the right and left portal veins and recanalized paraumbilical  vein.    I have personally reviewed the examination and initial interpretation  and I agree with the findings.    WAQAR HURTADO MD         SYSTEM ID:  [id number]       Discharge Medications   Discharge Medication List as of 1/29/2022  3:17 AM      CONTINUE these medications which have NOT CHANGED    Details   folic acid (FOLVITE) 1 MG tablet Take 1 mg by mouth, Historical      Multiple Vitamins-Minerals (SUPER THERA VINEET M) TABS Take 1 tablet by mouth, Historical      tetrahydrozoline (VISINE) 0.05 % ophthalmic solution Apply 1 drop to eye daily as needed (allergies) , Historical      thiamine (B-1) 100 MG tablet Take 100 mg by mouth, Historical           Allergies   Allergies   Allergen Reactions     Oxycodone Nausea and Vomiting     Severe emesis with opioid medications

## 2022-01-29 NOTE — PLAN OF CARE
/67   Pulse 97   Temp 98.4  F (36.9  C) (Oral)   Resp 18   Ht 1.829 m (6')   Wt 91.6 kg (202 lb)   SpO2 99%   BMI 27.40 kg/m      1530 - 1930    Neuro: A&Ox4.  Sitter at bedside at all times.  Cardiac: SR. VSS.   Respiratory: Sating 99% on RA.  GI/: No bm. Adequate urine output.  Diet/appetite: Tolerating reg diet.  Activity: Stand by assist up to chair.  Pain: At acceptable level on current regimen.   Skin: No new deficits noted.  LDA's:PIV saline locked.    Plan: Continue with POC. Notify primary team with changes.

## 2022-02-01 ENCOUNTER — TELEPHONE (OUTPATIENT)
Dept: GASTROENTEROLOGY | Facility: CLINIC | Age: 27
End: 2022-02-01

## 2022-02-01 LAB
BACTERIA BLD CULT: NO GROWTH
BACTERIA BLD CULT: NO GROWTH

## 2022-02-01 NOTE — TELEPHONE ENCOUNTER
Socrates Lim MD   Endoscopy Scheduling Pool; Leventhal, Thomas Michael, MD  Critical access hospital,     Could you please assist in scheduling:     Procedure/Appointment/Imaging: EGD   Physician: Any   Timin-2 weeks   Anesthesia: MAC   Dx: Alcoholic hepatitis with hematemesis       Thank you,   Socrates Lim   GI Fellow   Pager: [phone]      Talked with patient and he would like to think about the EGD and would like a call back tomorrow.

## 2022-02-03 ENCOUNTER — HOSPITAL ENCOUNTER (OUTPATIENT)
Facility: AMBULATORY SURGERY CENTER | Age: 27
End: 2022-02-03
Attending: INTERNAL MEDICINE

## 2022-02-03 ENCOUNTER — TELEPHONE (OUTPATIENT)
Dept: GASTROENTEROLOGY | Facility: CLINIC | Age: 27
End: 2022-02-03

## 2022-02-03 DIAGNOSIS — Z11.59 ENCOUNTER FOR SCREENING FOR OTHER VIRAL DISEASES: Primary | ICD-10-CM

## 2022-02-03 NOTE — TELEPHONE ENCOUNTER
Screening Questions  Blue=prep questions Red=location Green=sedation   1. Are you active on mychart? N    2. What insurance is in the chart? None     3.  Ordering/Referring Provider: Raphael    4. BMI 26.9, If greater than 40 review exclusion criteria also will need EXTENDED PREP    5.  Respiratory Screening (If yes to any of the following HOSPITAL setting only):     Do you use daily home oxygen? N  Do you have mod to severe Obstructive Sleep Apnea? N (can be seen at [facility] or hospital setting)    Do you have Pulmonary Hypertension? N   Do you have UNCONTROLLED asthma? N    6. Have you had a heart or lung transplant? N  (If yes, please review exclusion criteria)    7. Are you currently on dialysis?N  (If yes, schedule in HOSPITAL setting only)(If yes, please send Golytely prep)    8. Do you have chronic kidney disease? N (If yes, please send Golytely prep)    9. Have you had a stroke or Transient ischemic attack (TIA) within 6 months? N (If yes, do not schedule at [facility])    10. In the past 6 months, have you had any heart related issues including cardiomyopathy or heart attack? N (If yes, please review exclusion criteria)           If yes, did it require cardiac stenting or other implantable device?N  (If yes, please review exclusion criteria)      11. Do you have any implantable devices in your body (pacemaker, defib, LVAD)? N (If yes, schedule at [facility])    12. Do you take nitroglycerin? If yes, how often? N (if yes, schedule at HOSPITAL setting)    13. Are you currently taking any blood thinners?N (If yes- inform patient to follow up with PCP or provider for follow up instructions)     14. Are you a diabetic? N (If yes, please send Golytely prep)    15. (Females) Are you currently pregnant? NA  If yes, how many weeks?      16. Are you taking any prescription pain medications on a routine schedule? N If yes, MAC sedation and patient will need EXTENDED PREP.    17. Do you have any chemical dependencies such as alcohol,  street drugs, or methadone? Yes-alcohol If yes, MAC sedation     18. Do you have any history of post-traumatic stress syndrome, severe anxiety or history of psychosis? N  If yes, MAC sedation.     19. Do you transfer independently? Yes    20.  Do you have any issues with constipation? NA   If yes, pt will need EXTENDED PREP     21. Preferred Pharmacy for Pre Prescription [id number] - [place], [place] - [address] AT [facility] [address]    Scheduling Details    Which Colonoscopy Prep was Sent?: NA  Type of Procedure Scheduled: EGD  Surgeon: Heidi  Date of Procedure: 2/10/22  Location: [facility]  Caller (Please ask for phone number if not scheduled by patient): Tulio      Sedation Type: MAC  Conscious Sedation- Needs  for 6 hours after the procedure  MAC/General-Needs  for 24 hours after procedure    Pre-op Required at [facility], [place], [place] and OR for MAC sedation: NA  (if yes advise patient they will need a pre-op prior to procedure)      Informed patient they will need an adult  Yes  Cannot take any type of public or medical transportation alone    Pre-Procedure Covid test to be completed at [facility] or Externally: Covid + on 1/27/22 but never had any symptoms    Confirmed Nurse will call to complete assessment Yes    Additional comments:  ([facility] PATIENTS NEED EXTENDED PREP)

## 2022-02-03 NOTE — TELEPHONE ENCOUNTER
Patient scheduled for EGD on 2/10/22.     Covid test scheduled: 2/8/22    Arrival time: 0830    Facility location: [facility]    Sedation type: MAC    Indication for procedure: hematemesis     Anticoagulations? no     Pre visit planning completed.    Miguelina Dutta RN

## 2022-02-04 NOTE — TELEPHONE ENCOUNTER
Attempted to contact patient for pre assessment questions. No answer.     Left message to return call to [phone] #2    Patient is not mychart active.     Miguelina Dutta RN

## 2022-02-08 ENCOUNTER — LAB (OUTPATIENT)
Dept: LAB | Facility: CLINIC | Age: 27
End: 2022-02-08
Attending: INTERNAL MEDICINE

## 2022-02-08 DIAGNOSIS — Z11.59 ENCOUNTER FOR SCREENING FOR OTHER VIRAL DISEASES: ICD-10-CM

## 2022-02-08 PROCEDURE — U0005 INFEC AGEN DETEC AMPLI PROBE: HCPCS | Mod: 90 | Performed by: PATHOLOGY

## 2022-02-08 PROCEDURE — 99000 SPECIMEN HANDLING OFFICE-LAB: CPT | Performed by: PATHOLOGY

## 2022-02-08 PROCEDURE — U0003 INFECTIOUS AGENT DETECTION BY NUCLEIC ACID (DNA OR RNA); SEVERE ACUTE RESPIRATORY SYNDROME CORONAVIRUS 2 (SARS-COV-2) (CORONAVIRUS DISEASE [COVID-19]), AMPLIFIED PROBE TECHNIQUE, MAKING USE OF HIGH THROUGHPUT TECHNOLOGIES AS DESCRIBED BY CMS-2020-01-R: HCPCS | Mod: 90 | Performed by: PATHOLOGY

## 2022-02-09 ENCOUNTER — TELEPHONE (OUTPATIENT)
Dept: GASTROENTEROLOGY | Facility: CLINIC | Age: 27
End: 2022-02-09

## 2022-02-09 LAB — SARS-COV-2 RNA RESP QL NAA+PROBE: NEGATIVE

## 2022-02-09 NOTE — TELEPHONE ENCOUNTER
Attempted to contact patient regarding upcoming EGD procedure on 2.10.2022 for pre assessment questions.     Pt stated that he needs to cancel his procedure at this time and reschedule in a about a month due to financial reasons.  Pt stated that he will callback when he is ready to reschedule.  Message to endo scheduling.    Angeli Lindsey RN

## 2022-02-09 NOTE — TELEPHONE ENCOUNTER
Caller: Per staff message    Procedure: EGD     Date, Location, and Surgeon of Procedure Cancelled: 2/10/2022, Heidi ELLER    Ordering Provider:Raphael    Reason for cancel (please be detailed, any staff messages or encounters to note?): per staff message:  Angeli Lindsey RN  P Endoscopy Scheduling Pool  Endo scheduling,     Pt stated that he needs to cancel his EGD on 2.10.2022 at this time and reschedule in a about a month due to financial reasons.  Pt stated that he will callback when he is ready to reschedule.     Please cancel procedure.       Thank you,   Angeli Lindsey RN   Pre-Assessment Endoscopy           Rescheduled: not at this time.      If rescheduled:    Date:    Location:    Note any change or update to original order/sedation:

## 2022-02-17 PROBLEM — F10.20 ALCOHOL DEPENDENCE, UNCOMPLICATED (H): Status: ACTIVE | Noted: 2021-05-11
